# Patient Record
Sex: FEMALE | Race: WHITE | Employment: UNEMPLOYED | URBAN - METROPOLITAN AREA
[De-identification: names, ages, dates, MRNs, and addresses within clinical notes are randomized per-mention and may not be internally consistent; named-entity substitution may affect disease eponyms.]

---

## 2021-07-29 DIAGNOSIS — I10 BENIGN ESSENTIAL HTN: ICD-10-CM

## 2021-07-29 LAB
ANION GAP SERPL CALCULATED.3IONS-SCNC: 12 MMOL/L (ref 3–16)
BUN BLDV-MCNC: 16 MG/DL (ref 7–20)
CALCIUM SERPL-MCNC: 9.4 MG/DL (ref 8.3–10.6)
CHLORIDE BLD-SCNC: 102 MMOL/L (ref 99–110)
CO2: 28 MMOL/L (ref 21–32)
CREAT SERPL-MCNC: 0.7 MG/DL (ref 0.6–1.2)
GFR AFRICAN AMERICAN: >60
GFR NON-AFRICAN AMERICAN: >60
GLUCOSE BLD-MCNC: 81 MG/DL (ref 70–99)
POTASSIUM SERPL-SCNC: 4.6 MMOL/L (ref 3.5–5.1)
SODIUM BLD-SCNC: 142 MMOL/L (ref 136–145)

## 2022-06-08 PROBLEM — E66.811 CLASS 1 OBESITY DUE TO EXCESS CALORIES WITHOUT SERIOUS COMORBIDITY WITH BODY MASS INDEX (BMI) OF 31.0 TO 31.9 IN ADULT: Status: ACTIVE | Noted: 2022-05-26

## 2023-01-05 PROBLEM — E66.811 CLASS 1 OBESITY DUE TO EXCESS CALORIES WITHOUT SERIOUS COMORBIDITY WITH BODY MASS INDEX (BMI) OF 31.0 TO 31.9 IN ADULT: Chronic | Status: ACTIVE | Noted: 2022-05-26

## 2023-11-02 ENCOUNTER — OFFICE VISIT (OUTPATIENT)
Dept: VASCULAR SURGERY | Age: 63
End: 2023-11-02
Payer: COMMERCIAL

## 2023-11-02 VITALS
BODY MASS INDEX: 32.1 KG/M2 | DIASTOLIC BLOOD PRESSURE: 95 MMHG | WEIGHT: 187 LBS | HEART RATE: 74 BPM | SYSTOLIC BLOOD PRESSURE: 155 MMHG

## 2023-11-02 DIAGNOSIS — M79.89 SWELLING OF LIMB: Primary | ICD-10-CM

## 2023-11-02 PROCEDURE — G8417 CALC BMI ABV UP PARAM F/U: HCPCS | Performed by: SURGERY

## 2023-11-02 PROCEDURE — 3080F DIAST BP >= 90 MM HG: CPT | Performed by: SURGERY

## 2023-11-02 PROCEDURE — G8484 FLU IMMUNIZE NO ADMIN: HCPCS | Performed by: SURGERY

## 2023-11-02 PROCEDURE — 3017F COLORECTAL CA SCREEN DOC REV: CPT | Performed by: SURGERY

## 2023-11-02 PROCEDURE — G8427 DOCREV CUR MEDS BY ELIG CLIN: HCPCS | Performed by: SURGERY

## 2023-11-02 PROCEDURE — 3077F SYST BP >= 140 MM HG: CPT | Performed by: SURGERY

## 2023-11-02 PROCEDURE — 1036F TOBACCO NON-USER: CPT | Performed by: SURGERY

## 2023-11-02 PROCEDURE — 99203 OFFICE O/P NEW LOW 30 MIN: CPT | Performed by: SURGERY

## 2023-12-08 ENCOUNTER — OFFICE VISIT (OUTPATIENT)
Dept: ORTHOPEDIC SURGERY | Age: 63
End: 2023-12-08
Payer: COMMERCIAL

## 2023-12-08 VITALS — HEIGHT: 65 IN | BODY MASS INDEX: 29.97 KG/M2 | WEIGHT: 179.9 LBS

## 2023-12-08 DIAGNOSIS — M17.12 LOCALIZED OSTEOARTHRITIS OF LEFT KNEE: Primary | ICD-10-CM

## 2023-12-08 PROCEDURE — 1036F TOBACCO NON-USER: CPT | Performed by: ORTHOPAEDIC SURGERY

## 2023-12-08 PROCEDURE — 99213 OFFICE O/P EST LOW 20 MIN: CPT | Performed by: ORTHOPAEDIC SURGERY

## 2023-12-08 PROCEDURE — G8427 DOCREV CUR MEDS BY ELIG CLIN: HCPCS | Performed by: ORTHOPAEDIC SURGERY

## 2023-12-08 PROCEDURE — G8484 FLU IMMUNIZE NO ADMIN: HCPCS | Performed by: ORTHOPAEDIC SURGERY

## 2023-12-08 PROCEDURE — 3017F COLORECTAL CA SCREEN DOC REV: CPT | Performed by: ORTHOPAEDIC SURGERY

## 2023-12-08 PROCEDURE — G8417 CALC BMI ABV UP PARAM F/U: HCPCS | Performed by: ORTHOPAEDIC SURGERY

## 2023-12-13 ENCOUNTER — TELEPHONE (OUTPATIENT)
Dept: ORTHOPEDIC SURGERY | Age: 63
End: 2023-12-13

## 2023-12-13 NOTE — TELEPHONE ENCOUNTER
Other PATIENT IS REQUESTING A CALL BACK ONCE THE RECORDS FOR [facility] ARE RECEIVED.       PATIENT STATES LAST TIME THEY WERE SENT THE RECORDS WERE NOT READABLE    PATIENT CAN BE REACHED [phone]

## 2023-12-14 NOTE — TELEPHONE ENCOUNTER
RC/ Records Received     However - STILL NEED  \"obtain her last arthroscopic images from [facility]\"      LM on Machine    jm

## 2023-12-15 ENCOUNTER — OFFICE VISIT (OUTPATIENT)
Dept: ORTHOPEDIC SURGERY | Age: 63
End: 2023-12-15
Payer: COMMERCIAL

## 2023-12-15 VITALS — WEIGHT: 179 LBS | BODY MASS INDEX: 29.82 KG/M2 | HEIGHT: 65 IN

## 2023-12-15 DIAGNOSIS — M17.12 LOCALIZED OSTEOARTHRITIS OF LEFT KNEE: Primary | ICD-10-CM

## 2023-12-15 PROCEDURE — G8417 CALC BMI ABV UP PARAM F/U: HCPCS | Performed by: ORTHOPAEDIC SURGERY

## 2023-12-15 PROCEDURE — G8484 FLU IMMUNIZE NO ADMIN: HCPCS | Performed by: ORTHOPAEDIC SURGERY

## 2023-12-15 PROCEDURE — 99214 OFFICE O/P EST MOD 30 MIN: CPT | Performed by: ORTHOPAEDIC SURGERY

## 2023-12-15 PROCEDURE — G8427 DOCREV CUR MEDS BY ELIG CLIN: HCPCS | Performed by: ORTHOPAEDIC SURGERY

## 2023-12-15 PROCEDURE — 3017F COLORECTAL CA SCREEN DOC REV: CPT | Performed by: ORTHOPAEDIC SURGERY

## 2023-12-15 PROCEDURE — 1036F TOBACCO NON-USER: CPT | Performed by: ORTHOPAEDIC SURGERY

## 2023-12-15 NOTE — PROGRESS NOTES
There is a flap-type tear involving the inner and middle thirds of the  posterior body of the medial meniscus which is displaced inwardly and is  superimposed upon the root of the posterior horn of the medial meniscus. 2. There is abnormal linear signal identified within the middle third of the  anterior horn of the lateral meniscus which is suspicious for a meniscal tear. The body and posterior horn of the lateral meniscus are diminutive in size. Correlate for prior partial meniscectomy. 4. Small chondral defect is identified at the medial weightbearing surface of  the medial femoral condyle. 5. Patellofemoral joint osteoarthrosis as detailed above. 6. Moderate size joint effusion. Personal review of the MRI does demonstrate at least moderate thinning of the medial joint surface. Moderate thinning of the patellofemoral joint surface with well-maintained cartilage and laterally. She does have a medial and lateral meniscus tear    I reviewed scope images. Cruciate ligaments are intact. Degenerative meniscus tear greatly present over the medial side, minimally over the posterior horn lateral side. There is evidence of focal cartilage loss, but difficult to discern from the pictures whether it is the lateral part of the medial femoral condyle or the opposite. Grade III chondromalacia present in the patella from what it seems. Procedure:  No orders of the defined types were placed in this encounter. Assessment and [place] was seen today for knee pain. Diagnoses and all orders for this visit:    Localized osteoarthritis of left knee        Patient's MRI is definitely worse than her x-rays are for osteoarthritis. She has had a very extensive course of treatment and failed attempts at 2 arthroscopies. I do not believe she has enough arthritis to make total knee replacement highly predictable.   At most, I would consider a partial medial unicondylar knee replacement, possibly consider by

## 2024-01-11 ENCOUNTER — OFFICE VISIT (OUTPATIENT)
Dept: ORTHOPEDIC SURGERY | Age: 64
End: 2024-01-11

## 2024-01-11 VITALS — HEIGHT: 65 IN | BODY MASS INDEX: 29.82 KG/M2 | WEIGHT: 179 LBS

## 2024-01-11 DIAGNOSIS — G57.02 COMPRESSION OF COMMON PERONEAL NERVE OF LEFT LOWER EXTREMITY: ICD-10-CM

## 2024-01-11 DIAGNOSIS — M25.562 LEFT KNEE PAIN, UNSPECIFIED CHRONICITY: ICD-10-CM

## 2024-01-11 DIAGNOSIS — M17.12 LOCALIZED OSTEOARTHRITIS OF LEFT KNEE: Primary | ICD-10-CM

## 2024-01-11 RX ORDER — METHYLPREDNISOLONE ACETATE 40 MG/ML
40 INJECTION, SUSPENSION INTRA-ARTICULAR; INTRALESIONAL; INTRAMUSCULAR; SOFT TISSUE ONCE
Status: COMPLETED | OUTPATIENT
Start: 2024-01-11 | End: 2024-01-11

## 2024-01-11 RX ADMIN — METHYLPREDNISOLONE ACETATE 40 MG: 40 INJECTION, SUSPENSION INTRA-ARTICULAR; INTRALESIONAL; INTRAMUSCULAR; SOFT TISSUE at 12:18

## 2024-01-11 NOTE — PROGRESS NOTES
Bilateral plantar fasciitis 2016    COVID 2023    Obstructive sleep apnea syndrome 2023        Past Surgical History:   Procedure Laterality Date    BREAST BIOPSY  2021    fibroadenoma    BREAST REDUCTION SURGERY  2012    CHOLECYSTECTOMY  1995    COLONOSCOPY  2017    hyperplastic polyp    ERCP      ERCP      KNEE CARTILAGE SURGERY Left 2012    UPPER GASTROINTESTINAL ENDOSCOPY      retained stones--ERCP    US BREAST BIOPSY W LOC DEVICE 1ST LESION RIGHT Right 2021    US BREAST NEEDLE BIOPSY RIGHT 2021 Shelly Cueto MD [facility] ULTRASOUND       Family History   Problem Relation Age of Onset    High Blood Pressure Mother     High Blood Pressure Father     Heart Disease Father     Heart Disease Brother 66        STENTS    High Blood Pressure Brother     Sleep Apnea Brother     Cancer Maternal Grandfather 80        colon       Social History     Socioeconomic History    Marital status:     Number of children: 5   Occupational History    Occupation:    Tobacco Use    Smoking status: Former     Current packs/day: 0.00     Average packs/day: 0.1 packs/day for 10.0 years (1.0 ttl pk-yrs)     Types: Cigarettes     Start date:      Quit date:      Years since quittin.0    Smokeless tobacco: Never   Vaping Use    Vaping Use: Never used   Substance and Sexual Activity    Alcohol use: Yes     Alcohol/week: 1.0 standard drink of alcohol     Types: 1 Glasses of wine per week    Drug use: No    Sexual activity: Yes     Partners: Male   Social History Narrative    Happily -older kids out of home (from prev marriage) and  2 kids in college    Little exercise    Hobbies-none    +seatbelts    retired     Social Determinants of Health     Financial Resource Strain: Low Risk  (2023)    Overall Financial Resource Strain (CARDIA)     Difficulty of Paying Living Expenses: Not hard at all   Transportation Needs: Unknown (2023)    PRAPARE -

## 2024-01-15 ENCOUNTER — HOSPITAL ENCOUNTER (OUTPATIENT)
Dept: PHYSICAL THERAPY | Age: 64
Setting detail: THERAPIES SERIES
Discharge: HOME | End: 2024-01-15
Payer: COMMERCIAL

## 2024-01-15 DIAGNOSIS — R29.898 WEAKNESS OF LEFT LOWER EXTREMITY: ICD-10-CM

## 2024-01-15 DIAGNOSIS — M25.562 LEFT KNEE PAIN, UNSPECIFIED CHRONICITY: Primary | ICD-10-CM

## 2024-01-15 DIAGNOSIS — M25.469 EDEMA OF KNEE: ICD-10-CM

## 2024-01-15 PROCEDURE — 97161 PT EVAL LOW COMPLEX 20 MIN: CPT | Performed by: PHYSICAL THERAPIST

## 2024-01-15 PROCEDURE — 97016 VASOPNEUMATIC DEVICE THERAPY: CPT | Performed by: PHYSICAL THERAPIST

## 2024-01-15 PROCEDURE — 97110 THERAPEUTIC EXERCISES: CPT | Performed by: PHYSICAL THERAPIST

## 2024-01-15 NOTE — FLOWSHEET NOTE
in: 2 weeks  Independent in HEP and progression per patient tolerance, in order to progress toward full function and prevent re-injury.               Status: [] Progressing: [] Met: [] Not Met: [] Adjusted  Patient will have a decrease in pain to 0/10 to help  facilitate improvement in movement, function, and ADLs as indicated by functional deficits.              Status: [] Progressing: [] Met: [] Not Met: [] Adjusted     Long Term Goals: To be achieved in: 6 weeks  Disability index score of 20% or less for the LEFS to assist with return top prior level of function.                  Status: [] Progressing: [] Met: [] Not Met: [] Adjusted  Improve  knee AROM  Flexion and extension to 0-120 degrees or  better to allow for proper joint functioning as indicated by patients functional deficits.  Status: [] Progressing: [] Met: [] Not Met: [] Adjusted  Pt to improve strength to 4+/5 or better of proximal hip, quadriceps, and hamstringsto allow for proper muscle and joint use in functional mobility, ADLs and prior level of function  Status: [] Progressing: [] Met: [] Not Met: [] Adjusted  Patient will return to Usual work, housework or activities, light home activities, walk 2 blocks, and up/down 1 flight of stairs without increased symptoms or restriction to work towards return to prior level of function.  Status: [] Progressing: [] Met: [] Not Met: [] Adjusted  Patient will perform normal ADLs without symptoms 100% of the time                                                                                                                Status: [] Progressing: [] Met: [] Not Met: [] Adjusted    Overall Progression Towards Functional goals/ Treatment Progress Update:  [] Patient is progressing as expected towards functional goals listed.    [] Progression is slowed due to complexities/Impairments listed.  [] Progression has been slowed due to co-morbidities.  [x] Plan just implemented, too soon (<30days) to assess goals

## 2024-01-15 NOTE — PLAN OF CARE
Limitations (from functional questionnaire and intake):  Reduced ability to tolerate prolonged functional positions  Reduced ability to sleep  Reduced ability to perform lifting, reaching, carrying tasks  Reduced ability to squat  Reduced ability to ambulate prolonged functional periods/distances/surfaces  Reduced ability to ascend/descend stairs  reduced ability to kneel    Participation Restrictions:   Reduced participation in home management   Reduced participation in social activities    Classification :   Signs/symptoms consistent with internal derangement of knee/Hip    Barriers to/and or personal factors that will affect rehab potential:   None noted    Prognosis/Rehab Potential:  [] Excellent     [x] Good     [] Fair     [] Poor         Tolerance of evaluation/treatment:   [] Excellent     [x] Good     [] Fair     [] Poor      Physical Therapy Evaluation Complexity Justification  [x] A history of present problem and no personal factors and/or co-morbidities that impact the plan of care  [x] A total of 1-2 elements  found upon examination of body systems using standardized tests and measures addressing any of the following: body structures, functions (impairments), activity limitations, and/or participation restrictions  [x] A clinical presentation with stable and/or uncomplicated characteristics   [x] Clinical decision making of LOW (28251 - Typically 20 minutes face-to-face) complexity using standardized patient assessment instrument and/or measurable assessment of functional outcome.    GOALS     Patient stated goal: To return to walking and ADLs without pain or dysfunction 100% of the time.   Status: [] Progressing: [] Met: [] Not Met: [] Adjusted    Therapist goals for Patient:   Short Term Goals: To be achieved in: 2 weeks  Independent in HEP and progression per patient tolerance, in order to progress toward full function and prevent re-injury.    Status: [] Progressing: [] Met: [] Not Met: []

## 2024-01-29 ENCOUNTER — HOSPITAL ENCOUNTER (OUTPATIENT)
Dept: PHYSICAL THERAPY | Age: 64
Setting detail: THERAPIES SERIES
Discharge: HOME | End: 2024-01-29
Payer: COMMERCIAL

## 2024-01-29 PROCEDURE — 97110 THERAPEUTIC EXERCISES: CPT | Performed by: PHYSICAL THERAPIST

## 2024-01-29 NOTE — FLOWSHEET NOTE
[facility]- Outpatient Rehabilitation and Therapy [address], [place] [address] office: [phone] fax: [phone]      Physical Therapy: TREATMENT/PROGRESS NOTE   Patient: Amina Sanon (63 y.o. female)   Treatment Date: 2024   :  1960 MRN: [id number]   Visit #: 2   Insurance Allowable Auth Needed   TBD []Yes    []No    Insurance: Payor: UNITED HE[employer]CARE / Plan: [employer] - CHOICE PLU / Product Type: *No Product type* /   Insurance ID: [id number] - (Commercial)  Secondary Insurance (if applicable):    Treatment Diagnosis:   No diagnosis found.     Medical Diagnosis:    Localized osteoarthritis of left knee [M17.12]  Left knee pain, unspecified chronicity [M25.562]   Referring Physician: Georges Babcock MD  PCP: Manolo Bonilla MD                             Plan of care signed (Y/N):     Date of Patient follow up with Physician:      Progress Report/POC: NO  POC update due: (10 visits /OR AUTH LIMITS, whichever is less)  2024       Preferred Language for Healthcare:   [x]English       []other:    SUBJECTIVE EXAMINATION     Patient Report/Comments: Patient reports significant improvement in symptoms following her corticosteroid injection on . Denies any complications with HEP.  EMG is scheduled for Friday.       Test used Initial score  1/15/24    Pain Summary VAS 3/10    Functional questionnaire LEFS 47% deficit    Other:                OBJECTIVE EXAMINATION     Observation:     Test measurements: see eval    Exercises/Interventions:     Therapeutic Ex (03709)  resistance Sets/time Reps Notes/Cues/Progressions   Calf stretch slant board  30\" 5           SLR 1# 3 10    SL hip abd  3 10    SLR+  15\" 5    LAQ 2# 3 10    Clamshells  Blue loop 3 10    Glute bridges Blue loop 3 10    Wall sits   30\" 5 ROM: 60 deg          Manual Intervention (35737)  TIME                                        NMR re-education (06435) resistance Sets/time

## 2024-02-01 ENCOUNTER — HOSPITAL ENCOUNTER (OUTPATIENT)
Dept: PHYSICAL THERAPY | Age: 64
Setting detail: THERAPIES SERIES
Discharge: HOME | End: 2024-02-01
Payer: COMMERCIAL

## 2024-02-01 PROCEDURE — 97110 THERAPEUTIC EXERCISES: CPT | Performed by: PHYSICAL THERAPIST

## 2024-02-01 NOTE — FLOWSHEET NOTE
musculoskeletal condition(s) with a corresponding ICD-10 code that is of complexity and severity that require skilled therapeutic intervention. This has a direct and significant impact on the need for therapy and significantly impacts the rate of recovery.     Treatment/Activity Tolerance:  [x] Patient tolerated treatment well [] Patient limited by fatique  [] Patient limited by pain  [] Patient limited by other medical complications  [] Other:     Return to Play: NA    Prognosis for POC: [x] Good [] Fair  [] Poor    Patient requires continued skilled intervention: [x] Yes  [] No      GOALS     Patient stated goal: To return to ADLs without pain or dysfunction 100% of the time.   Status: [] Progressing: [] Met: [] Not Met: [] Adjusted     Therapist goals for Patient:   Short Term Goals: To be achieved in: 2 weeks  Independent in HEP and progression per patient tolerance, in order to progress toward full function and prevent re-injury.               Status: [] Progressing: [] Met: [] Not Met: [] Adjusted  Patient will have a decrease in pain to 0/10 to help  facilitate improvement in movement, function, and ADLs as indicated by functional deficits.              Status: [] Progressing: [] Met: [] Not Met: [] Adjusted     Long Term Goals: To be achieved in: 6 weeks  Disability index score of 20% or less for the LEFS to assist with return top prior level of function.                  Status: [] Progressing: [] Met: [] Not Met: [] Adjusted  Improve  knee AROM  Flexion and extension to 0-120 degrees or  better to allow for proper joint functioning as indicated by patients functional deficits.  Status: [] Progressing: [] Met: [] Not Met: [] Adjusted  Pt to improve strength to 4+/5 or better of proximal hip, quadriceps, and hamstringsto allow for proper muscle and joint use in functional mobility, ADLs and prior level of function  Status: [] Progressing: [] Met: [] Not Met: [] Adjusted  Patient will return to Usual work,

## 2024-02-05 ENCOUNTER — HOSPITAL ENCOUNTER (OUTPATIENT)
Dept: PHYSICAL THERAPY | Age: 64
Setting detail: THERAPIES SERIES
Discharge: HOME | End: 2024-02-05
Payer: COMMERCIAL

## 2024-02-05 PROCEDURE — 97110 THERAPEUTIC EXERCISES: CPT | Performed by: PHYSICAL THERAPIST

## 2024-02-05 NOTE — FLOWSHEET NOTE
[facility]- Outpatient Rehabilitation and Therapy [address], [place] [address] office: [phone] fax: [phone]      Physical Therapy: TREATMENT/PROGRESS NOTE   Patient: Amina Sanon (63 y.o. female)   Treatment Date: 2024   :  1960 MRN: [id number]   Visit #: 4   Insurance Allowable Auth Needed   TBD []Yes    []No    Insurance: Payor: UNITED HEALTHCARE / Plan: [employer] - CHOICE PLU / Product Type: *No Product type* /   Insurance ID: [id number] - (Commercial)  Secondary Insurance (if applicable):    Treatment Diagnosis:   No diagnosis found.     Medical Diagnosis:    Localized osteoarthritis of left knee [M17.12]  Left knee pain, unspecified chronicity [M25.562]   Referring Physician: Georges Babcock MD  PCP: Manolo Bonilla MD                             Plan of care signed (Y/N):     Date of Patient follow up with Physician:      Progress Report/POC: NO  POC update due: (10 visits /OR AUTH LIMITS, whichever is less)  2024       Preferred Language for Healthcare:   [x]English       []other:    SUBJECTIVE EXAMINATION     Patient Report/Comments: Patient reports constant, vague discomfort in her PFJ since last visit but overall improved compared to initial evaluation. She has noticed an increase in strength as she is managing stairs better.  She is able to walk 1 mile without an increase in symptoms.  EMG performed last Friday and does not have results yet.         Test used Initial score  1/15/24    Pain Summary VAS 3/10    Functional questionnaire LEFS 47% deficit    Other:                OBJECTIVE EXAMINATION     Observation:     Test measurements: see eval    Exercises/Interventions:     Therapeutic Ex (37351)  resistance Sets/time Reps Notes/Cues/Progressions   Calf stretch slant board  30\" 5    Seated hamstring stretch  30\" 5    SLR 2# 3 10    SL hip abd 1# 3 10 Progress NPV   SLR+  20\" 5    LAQ 2# 3 10 Held 2/5   Clamshells  Blue loop 3

## 2024-02-08 ENCOUNTER — HOSPITAL ENCOUNTER (OUTPATIENT)
Dept: PHYSICAL THERAPY | Age: 64
Setting detail: THERAPIES SERIES
Discharge: HOME | End: 2024-02-08
Payer: COMMERCIAL

## 2024-02-08 PROCEDURE — 97110 THERAPEUTIC EXERCISES: CPT | Performed by: PHYSICAL THERAPIST

## 2024-02-08 NOTE — FLOWSHEET NOTE
strength or increase flexibility, following either an injury or surgery.   (13570) HOME EXERCISE PROGRAM - Reviewed/Progressed HEP activities related to strengthening, flexibility, endurance, ROM performed to prevent loss of range of motion, maintain or improve muscular strength or increase flexibility, following either an injury or surgery.  (29450) VASOPNEUMATIC    TREATMENT PLAN   Plan: Cont POC- Continue emphasis/focus on exercise progression, modulating pain, and reduce/eliminate soft tissue swelling/inflammation/restriction. Next visit plan to add new exercises     Electronically Signed by RICARDO Vuong              Date: 02/08/2024     Note: If patient does not return for scheduled/recommended follow up visits, this note will serve as a discharge from care along with the most recent update on progress.

## 2024-02-09 RX ORDER — TIRZEPATIDE 2.5 MG/.5ML
2.5 INJECTION, SOLUTION SUBCUTANEOUS WEEKLY
Qty: 2 ML | Refills: 2 | Status: SHIPPED | OUTPATIENT
Start: 2024-02-09

## 2024-02-12 ENCOUNTER — HOSPITAL ENCOUNTER (OUTPATIENT)
Dept: PHYSICAL THERAPY | Age: 64
Setting detail: THERAPIES SERIES
Discharge: HOME | End: 2024-02-12
Payer: COMMERCIAL

## 2024-02-12 PROCEDURE — 97110 THERAPEUTIC EXERCISES: CPT | Performed by: PHYSICAL THERAPIST

## 2024-02-12 NOTE — FLOWSHEET NOTE
following either an injury or surgery.   (88834) HOME EXERCISE PROGRAM - Reviewed/Progressed HEP activities related to strengthening, flexibility, endurance, ROM performed to prevent loss of range of motion, maintain or improve muscular strength or increase flexibility, following either an injury or surgery.  (02568) VASOPNEUMATIC    TREATMENT PLAN   Plan: Cont POC- Continue emphasis/focus on exercise progression, modulating pain, and reduce/eliminate soft tissue swelling/inflammation/restriction. Next visit plan to add new exercises     Electronically Signed by RICARDO Vuong              Date: 02/12/2024     Note: If patient does not return for scheduled/recommended follow up visits, this note will serve as a discharge from care along with the most recent update on progress.

## 2024-02-13 ENCOUNTER — OFFICE VISIT (OUTPATIENT)
Dept: ORTHOPEDIC SURGERY | Age: 64
End: 2024-02-13
Payer: COMMERCIAL

## 2024-02-13 VITALS — WEIGHT: 179 LBS | HEIGHT: 65 IN | BODY MASS INDEX: 29.82 KG/M2

## 2024-02-13 DIAGNOSIS — M17.12 LOCALIZED OSTEOARTHRITIS OF LEFT KNEE: Primary | ICD-10-CM

## 2024-02-13 DIAGNOSIS — G57.02 COMPRESSION OF COMMON PERONEAL NERVE OF LEFT LOWER EXTREMITY: ICD-10-CM

## 2024-02-13 PROCEDURE — G8484 FLU IMMUNIZE NO ADMIN: HCPCS | Performed by: ORTHOPAEDIC SURGERY

## 2024-02-13 PROCEDURE — 3017F COLORECTAL CA SCREEN DOC REV: CPT | Performed by: ORTHOPAEDIC SURGERY

## 2024-02-13 PROCEDURE — G8417 CALC BMI ABV UP PARAM F/U: HCPCS | Performed by: ORTHOPAEDIC SURGERY

## 2024-02-13 PROCEDURE — 99214 OFFICE O/P EST MOD 30 MIN: CPT | Performed by: ORTHOPAEDIC SURGERY

## 2024-02-13 PROCEDURE — G8428 CUR MEDS NOT DOCUMENT: HCPCS | Performed by: ORTHOPAEDIC SURGERY

## 2024-02-13 PROCEDURE — 1036F TOBACCO NON-USER: CPT | Performed by: ORTHOPAEDIC SURGERY

## 2024-02-13 NOTE — PROGRESS NOTES
Date:  2024    Name:  Amina Sanon  Address:  [address] [place] [address]   [facility] [address]    :  1960      Age:   63 y.o.        Chief Complaint    Knee Pain (Left knee EMG TR 24)    History of Present Illness:  Amina Sanon is a 63 y.o. female who presents for repeat evaluation today of her left knee.  She was last seen in early January due to progressive left knee pain.  At that visit, MRI was reviewed and did show evidence of degenerative meniscal pathology as well as underlying osteoarthritis.  At that time, she underwent a corticosteroid injection for her left knee as well as was sent for referral for an EMG/nerve conduction study due to concern for peroneal nerve entrapment at the fibular neck.    Patient reports now that her knee symptoms have substantially improved status post injection.  She reports that now she is able to do many more things including walking comfortably, swelling improvement, and improved range of motion since this injection.  She says that this is one of the first injections that have provided relief but she feels she is substantially improved after this time.  She was brought today to review her EMG/nerve conduction study results performed at [facility] by Dr. Frank.  No new injuries.  No other complaints this time.          Past Medical History:   Diagnosis Date    Allergic rhinitis 2022    Benign essential HTN 2006    Bilateral plantar fasciitis 2016    COVID 2023    Obstructive sleep apnea syndrome 2023        Past Surgical History:   Procedure Laterality Date    BREAST BIOPSY  2021    fibroadenoma    BREAST REDUCTION SURGERY      CHOLECYSTECTOMY  1995    COLONOSCOPY  2017    hyperplastic polyp    ERCP      ERCP      KNEE CARTILAGE SURGERY Left     UPPER GASTROINTESTINAL ENDOSCOPY      retained stones--ERCP    US BREAST BIOPSY W LOC DEVICE 1ST LESION RIGHT Right 2021    US BREAST NEEDLE BIOPSY

## 2024-02-15 ENCOUNTER — HOSPITAL ENCOUNTER (OUTPATIENT)
Dept: PHYSICAL THERAPY | Age: 64
Setting detail: THERAPIES SERIES
Discharge: HOME | End: 2024-02-15
Payer: COMMERCIAL

## 2024-02-15 ENCOUNTER — HOSPITAL ENCOUNTER (OUTPATIENT)
Dept: MAMMOGRAPHY | Age: 64
Discharge: HOME | End: 2024-02-15
Payer: COMMERCIAL

## 2024-02-15 VITALS — BODY MASS INDEX: 29.82 KG/M2 | WEIGHT: 179 LBS | HEIGHT: 65 IN

## 2024-02-15 DIAGNOSIS — Z12.31 VISIT FOR SCREENING MAMMOGRAM: ICD-10-CM

## 2024-02-15 PROCEDURE — 97530 THERAPEUTIC ACTIVITIES: CPT | Performed by: PHYSICAL THERAPIST

## 2024-02-15 PROCEDURE — 97110 THERAPEUTIC EXERCISES: CPT | Performed by: PHYSICAL THERAPIST

## 2024-02-15 PROCEDURE — 77063 BREAST TOMOSYNTHESIS BI: CPT

## 2024-02-15 NOTE — FLOWSHEET NOTE
Manual (03634)    Estim Unattended (74003)     Ther. Act (10368)  1  Mech. Traction (47182)     Gait (32727)    Dry Needle 1-2 muscle (20560)     Aquatic Therex (23280)    Dry Needle 3+ muscle (20561)     Iontophoresis (76598)    VASO (11840)     Ultrasound (47788)    Group Therapy (41098)     Estim Attended (62234)    Canalith Repositioning (92434)     Other:    Other:    Total Timed Code Tx Minutes 32'        Total Treatment Minutes 1:40-3:00 80'        Charge Justification:  (92691) THERAPEUTIC EXERCISE - Provided verbal/tactile cueing for activities related to strengthening, flexibility, endurance, ROM performed to prevent loss of range of motion, maintain or improve muscular strength or increase flexibility, following either an injury or surgery.   (42557) HOME EXERCISE PROGRAM - Reviewed/Progressed HEP activities related to strengthening, flexibility, endurance, ROM performed to prevent loss of range of motion, maintain or improve muscular strength or increase flexibility, following either an injury or surgery.  (76476) THERAPEUTIC ACTIVITY - use of dynamic activities to improve functional performance. (Ex include squatting, ascending/descending stairs, walking, bending, lifting, catching, throwing, pushing, pulling, jumping.)  Direct, one on one contact, billed in 15-minute increments.  (68400) VASOPNEUMATIC    TREATMENT PLAN   Plan: Cont POC- Continue emphasis/focus on exercise progression, modulating pain, and reduce/eliminate soft tissue swelling/inflammation/restriction. Next visit plan to add new exercises     Electronically Signed by RICARDO Vuong              Date: 02/15/2024     Note: If patient does not return for scheduled/recommended follow up visits, this note will serve as a discharge from care along with the most recent update on progress.

## 2024-02-20 ENCOUNTER — HOSPITAL ENCOUNTER (OUTPATIENT)
Dept: PHYSICAL THERAPY | Age: 64
Setting detail: THERAPIES SERIES
Discharge: HOME | End: 2024-02-20
Payer: COMMERCIAL

## 2024-02-20 PROCEDURE — 97110 THERAPEUTIC EXERCISES: CPT | Performed by: PHYSICAL THERAPIST

## 2024-02-20 PROCEDURE — 97530 THERAPEUTIC ACTIVITIES: CPT | Performed by: PHYSICAL THERAPIST

## 2024-02-20 NOTE — FLOWSHEET NOTE
[facility]- Outpatient Rehabilitation and Therapy [address], [place] [address] office: [phone] fax: [phone]      Physical Therapy: TREATMENT/PROGRESS NOTE   Patient: Amina Sanon (63 y.o. female)   Treatment Date: 2024   :  1960 MRN: [id number]   Visit #: 8   Insurance Allowable Auth Needed   TBD []Yes    []No    Insurance: Payor: UNITED HEALTHCARE / Plan: [employer] - CHOICE PLU / Product Type: *No Product type* /   Insurance ID: [id number] - (Commercial)  Secondary Insurance (if applicable):    Treatment Diagnosis:   No diagnosis found.     Medical Diagnosis:    Localized osteoarthritis of left knee [M17.12]  Left knee pain, unspecified chronicity [M25.562]   Referring Physician: Georges Babcock MD  PCP: Manolo Bonilla MD                             Plan of care signed (Y/N):     Date of Patient follow up with Physician:      Progress Report/POC: YES, Date Range for this report: 2/15/24 - 3/15/24  POC update due: (10 visits /OR AUTH LIMITS, whichever is less)  3/15/2024       Preferred Language for Healthcare:   [x]English       []other:    SUBJECTIVE EXAMINATION     Patient Report/Comments: Patient reports overall improvement in her knee pain.  Reports that she did note a slight increase in symptoms along the peroneal muscles after last visit.  May be related to the change in activities in therapy.         Test used Initial score  1/15/24 2/15/24   Pain Summary VAS 3/10 0/10   Functional questionnaire LEFS 47% deficit 29% deficit    Other:                OBJECTIVE EXAMINATION     Observation:     Test measurements:   ROM/Strength: (Blank cells denote NT)   2/15/24    Mvmt (norm) AROM L AROM R Notes PROM L PROM R Notes                        LUMBAR Flex (90)              Ext (25)              SB (25)                Rotation (30)                                       HIP Flex (120)                Abd (45)                ER (50)

## 2024-02-22 ENCOUNTER — APPOINTMENT (OUTPATIENT)
Dept: PHYSICAL THERAPY | Age: 64
End: 2024-02-22
Payer: COMMERCIAL

## 2024-02-26 ENCOUNTER — HOSPITAL ENCOUNTER (OUTPATIENT)
Dept: PHYSICAL THERAPY | Age: 64
Setting detail: THERAPIES SERIES
Discharge: HOME | End: 2024-02-26
Payer: COMMERCIAL

## 2024-02-26 PROCEDURE — 97530 THERAPEUTIC ACTIVITIES: CPT | Performed by: PHYSICAL THERAPIST

## 2024-02-26 PROCEDURE — 97110 THERAPEUTIC EXERCISES: CPT | Performed by: PHYSICAL THERAPIST

## 2024-02-26 NOTE — FLOWSHEET NOTE
[facility]- Outpatient Rehabilitation and Therapy [address], [place] [address] office: [phone] fax: [phone]      Physical Therapy: TREATMENT/PROGRESS NOTE   Patient: Amina Sanon (63 y.o. female)   Treatment Date: 2024   :  1960 MRN: [id number]   Visit #: 9   Insurance Allowable Auth Needed   TBD []Yes    []No    Insurance: Payor: UNITED HEALTHCARE / Plan: [employer] - CHOICE PLU / Product Type: *No Product type* /   Insurance ID: [id number] - (Commercial)  Secondary Insurance (if applicable):    Treatment Diagnosis:   No diagnosis found.     Medical Diagnosis:    Localized osteoarthritis of left knee [M17.12]  Left knee pain, unspecified chronicity [M25.562]   Referring Physician: Georges Babcock MD  PCP: Manolo Bonilla MD                             Plan of care signed (Y/N):     Date of Patient follow up with Physician:      Progress Report/POC: YES, Date Range for this report: 2/15/24 - 3/15/24  POC update due: (10 visits /OR AUTH LIMITS, whichever is less)  3/15/2024       Preferred Language for Healthcare:   [x]English       []other:    SUBJECTIVE EXAMINATION     Patient Report/Comments: Patient reports that her knee has been doing really well the past couple of days.  She reports that can't recall any particular reason why she is feeling better.  Nerve symptoms seem to be better as well.         Test used Initial score  1/15/24 2/15/24   Pain Summary VAS 3/10 0/10   Functional questionnaire LEFS 47% deficit 29% deficit    Other:                OBJECTIVE EXAMINATION     Observation:     Test measurements:   ROM/Strength: (Blank cells denote NT)   2/15/24    Mvmt (norm) AROM L AROM R Notes PROM L PROM R Notes                        LUMBAR Flex (90)              Ext (25)              SB (25)                Rotation (30)                                       HIP Flex (120)                Abd (45)                ER (50)                IR

## 2024-02-29 ENCOUNTER — HOSPITAL ENCOUNTER (OUTPATIENT)
Dept: PHYSICAL THERAPY | Age: 64
Setting detail: THERAPIES SERIES
Discharge: HOME | End: 2024-02-29
Payer: COMMERCIAL

## 2024-02-29 PROCEDURE — 97110 THERAPEUTIC EXERCISES: CPT | Performed by: PHYSICAL THERAPIST

## 2024-02-29 PROCEDURE — 97530 THERAPEUTIC ACTIVITIES: CPT | Performed by: PHYSICAL THERAPIST

## 2024-02-29 NOTE — FLOWSHEET NOTE
[facility]- Outpatient Rehabilitation and Therapy [address], [place] [address] office: [phone] fax: [phone]      Physical Therapy: TREATMENT/PROGRESS NOTE   Patient: Amina Sanon (63 y.o. female)   Treatment Date: 2024   :  1960 MRN: [id number]   Visit #: 10   Insurance Allowable Auth Needed   TBD []Yes    []No    Insurance: Payor: [employer] / Plan: [employer] - CHOICE PLU / Product Type: *No Product type* /   Insurance ID: [id number] - (Commercial)  Secondary Insurance (if applicable):    Treatment Diagnosis:   No diagnosis found.     Medical Diagnosis:    Localized osteoarthritis of left knee [M17.12]  Left knee pain, unspecified chronicity [M25.562]   Referring Physician: Georges Babcock MD  PCP: Manolo Bonilla MD                             Plan of care signed (Y/N):     Date of Patient follow up with Physician:      Progress Report/POC: YES, Date Range for this report: 2/15/24 - 3/15/24  POC update due: (10 visits /OR AUTH LIMITS, whichever is less)  3/15/2024       Preferred Language for Healthcare:   [x]English       []other:    SUBJECTIVE EXAMINATION     Patient Report/Comments: Patient reports that her knee has been doing really well the past couple of days.  Reports no pain at all so far today.  She does note some discomfort yesterday.  She believes this may have been due to the change in weather.  She does feel like the nerve gliding is helping her symptoms.         Test used Initial score  1/15/24 2/15/24   Pain Summary VAS 3/10 0/10   Functional questionnaire LEFS 47% deficit 29% deficit    Other:                OBJECTIVE EXAMINATION     Observation:     Test measurements:   ROM/Strength: (Blank cells denote NT)   2/15/24    Mvmt (norm) AROM L AROM R Notes PROM L PROM R Notes                        LUMBAR Flex (90)              Ext (25)              SB (25)                Rotation (30)

## 2024-03-05 ENCOUNTER — HOSPITAL ENCOUNTER (OUTPATIENT)
Dept: PHYSICAL THERAPY | Age: 64
Setting detail: THERAPIES SERIES
Discharge: HOME | End: 2024-03-05
Payer: COMMERCIAL

## 2024-03-05 PROCEDURE — 97110 THERAPEUTIC EXERCISES: CPT | Performed by: PHYSICAL THERAPY ASSISTANT

## 2024-03-05 PROCEDURE — 97110 THERAPEUTIC EXERCISES: CPT | Performed by: PHYSICAL THERAPIST

## 2024-03-05 PROCEDURE — 97530 THERAPEUTIC ACTIVITIES: CPT | Performed by: PHYSICAL THERAPIST

## 2024-03-05 PROCEDURE — 97530 THERAPEUTIC ACTIVITIES: CPT | Performed by: PHYSICAL THERAPY ASSISTANT

## 2024-03-05 NOTE — FLOWSHEET NOTE
flexibility, endurance, ROM performed to prevent loss of range of motion, maintain or improve muscular strength or increase flexibility, following either an injury or surgery.   (18608) HOME EXERCISE PROGRAM - Reviewed/Progressed HEP activities related to strengthening, flexibility, endurance, ROM performed to prevent loss of range of motion, maintain or improve muscular strength or increase flexibility, following either an injury or surgery.  (54713) THERAPEUTIC ACTIVITY - use of dynamic activities to improve functional performance. (Ex include squatting, ascending/descending stairs, walking, bending, lifting, catching, throwing, pushing, pulling, jumping.)  Direct, one on one contact, billed in 15-minute increments.  (04090) VASOPNEUMATIC    TREATMENT PLAN   Plan: Cont POC- Continue emphasis/focus on exercise progression, modulating pain, and reduce/eliminate soft tissue swelling/inflammation/restriction. Next visit plan to add new exercises   Re-assess NPV, anticipate d/c to I HEP.    Electronically Signed by Sharron Marina, PTA              Date: 03/05/2024     Note: If patient does not return for scheduled/recommended follow up visits, this note will serve as a discharge from care along with the most recent update on progress.

## 2024-03-07 ENCOUNTER — HOSPITAL ENCOUNTER (OUTPATIENT)
Dept: PHYSICAL THERAPY | Age: 64
Setting detail: THERAPIES SERIES
Discharge: HOME | End: 2024-03-07
Payer: COMMERCIAL

## 2024-03-07 PROCEDURE — 97530 THERAPEUTIC ACTIVITIES: CPT | Performed by: PHYSICAL THERAPIST

## 2024-03-07 PROCEDURE — 97110 THERAPEUTIC EXERCISES: CPT | Performed by: PHYSICAL THERAPIST

## 2024-03-07 NOTE — DISCHARGE SUMMARY
[facility]- Outpatient Rehabilitation and Therapy [address], [address], [place] [address] office: [phone] fax: [phone]    Physical Therapy Re-Certification Plan of Care    Dear Georges Babcock MD  ,    We had the pleasure of treating the following patient for physical therapy services at [facility]. A summary of our findings can be found in the updated assessment below.  This includes our plan of care.  If you have any questions or concerns regarding these findings, please do not hesitate to contact me at the office phone number checked above.  Thank you for the referral.     Physician Signature:________________________________Date:__________________  By signing above (or electronic signature), therapist's plan is approved by physician      Overall Response to Treatment:   [x]Patient is responding well to treatment and improvement is noted with regards to goals   [x]Patient should continue to improve in reasonable time if they continue HEP   []Patient has plateaued and is no longer responding to skilled PT intervention    []Patient is getting worse and would benefit from return to referring MD   []Patient unable to adhere to initial POC   []Other:     Total Visits: 12     Recommendation:    [] Continue PT x / wk for  weeks.   [] Hold PT, pending MD visit   [x] Discharge to HEP. Follow up with PT or MD PRN.     Physical Therapy: TREATMENT/PROGRESS NOTE   Patient: Amina Sanon (63 y.o. female)   Treatment Date: 2024   :  1960 MRN: [id number]   Visit #: 12   Insurance Allowable Auth Needed   TBD []Yes    []No    Insurance: Payor: UNITED HE[employer]CARE / Plan: [employer] - CHOICE PLU / Product Type: *No Product type* /   Insurance ID: [id number] - (Commercial)  Secondary Insurance (if applicable):    Treatment Diagnosis:   No diagnosis found.     Medical Diagnosis:    Localized osteoarthritis of left knee [M17.12]  Left knee pain,

## 2024-03-11 ENCOUNTER — APPOINTMENT (OUTPATIENT)
Dept: PHYSICAL THERAPY | Age: 64
End: 2024-03-11
Payer: COMMERCIAL

## 2024-03-14 ENCOUNTER — APPOINTMENT (OUTPATIENT)
Dept: PHYSICAL THERAPY | Age: 64
End: 2024-03-14
Payer: COMMERCIAL

## 2024-03-19 ENCOUNTER — HOSPITAL ENCOUNTER (OUTPATIENT)
Dept: PHYSICAL THERAPY | Age: 64
Discharge: HOME | End: 2024-03-19

## 2024-03-19 PROCEDURE — 9990000027 HC GAP GROUP

## 2024-03-19 NOTE — FLOWSHEET NOTE
[facility] -- Orthopedic and Sports Medicine [place],   Sports Performance and Rehabilitation, [place] [address] [place], [place] [address]   Phone: [phone]       Date:  3/19/2024    Patient Name:  Amina Sanon    :  1960  MRN: [id number]  Restrictions/Precautions:  kneeling   Medical/Treatment Diagnosis Information: Localized osteoarthritis of left knee [M17.12]   Physician Information:   Dr. Babcock     Patient is Post-Op [] Yes   [x] No     DOS:      Visit Number:  $40  Billed Date:  3/19/23     Subjective: Amina presents to [facility] upon discharge from PT and recommendation for low impact strength training. She reports that she has goals of increasing strength and decreasing body fat percentage. She struggles with confidence in her knee and does not feel comfortable with the stability of her knee.      Objective: See PT discharge note       Exercises: Sets/Reps/Weight:   DL press   Wide stand press  Out on 2 in on 1    2R1B1Y 20x   Heel raise  SL heel raise   2R 20x   Core:  Arm press  Curl ups  Knee drop aways   SL Kick w/ opposite leg supported @ bar    1R1Y 10x   DL bridge  2R 10x                            Other Therapeutic Activities:   Hip flexor long lunge stretch   Standing pigeon stretch  Hamstring stretch     Home Exercise Program:   See PT note     Patient Education:      Cueing for breath timing and TA activation with each exercise     Assessment:  [x] Patient tolerated treatment well     [x] Patient limited by fatigue  [] Patient limited by pain                    [] Patient limited by other medical complications  [] Other:      Prognosis:     [x] Good          [] Fair             [] Poor     Patient Requires Follow-up:            [x] Yes             [] No     Plan:      [x] Continue per plan of care  [] Alter current plan (see comments)  [x] Plan of care initiated          [] Hold pending MD visit        [] Discharge       Electronically signed by:  Vicenta

## 2024-03-25 ENCOUNTER — HOSPITAL ENCOUNTER (OUTPATIENT)
Dept: PHYSICAL THERAPY | Age: 64
Discharge: HOME | End: 2024-03-25

## 2024-03-25 PROCEDURE — 9990000029 HC GAP PACKAGE

## 2024-03-25 NOTE — FLOWSHEET NOTE
[facility] -- Orthopedic and Sports Medicine [place],   Sports Performance and Rehabilitation, [place] [address] [place], [place] [address]   Phone: [phone]        Date:  3/19/2024     Patient Name:  Amina Sanon                     :  1960                     MRN: [id number]  Restrictions/Precautions:  kneeling   Medical/Treatment Diagnosis Information: Localized osteoarthritis of left knee [M17.12]   Physician Information:   Dr. Babcock      Patient is Post-Op [] Yes   [x] No     DOS:        Visit Number:   Billed Date:  $40 3/19/23, $215 3/25/24     Subjective: Amina presents to [facility] upon discharge from PT and recommendation for low impact strength training. She reports that she has goals of increasing strength and decreasing body fat percentage. She struggles with confidence in her knee and does not feel comfortable with the stability of her knee.      Objective: See PT discharge note         Exercises: Sets/Reps/Weight:   DL press   Wide stand press  Out on 2 in on 1     2R1B1Y 20x   Heel raise  SL heel raise    2R 20x   Core:  Arm press  Curl ups returning feet to bar with each return  Knee drop aways   SL Kick w/ opposite leg supported @ bar     1R1Y 10x   DL bridge   DL eccentric bridge 2R1B 10x        Standing SL press back   1R1B 15x                              Other Therapeutic Activities:   Hip flexor long lunge stretch   Standing pigeon stretch  Hamstring stretch     Home Exercise Program:   See PT note     Patient Education:      Cueing for breath timing and TA activation with each exercise     Assessment:  [x] Patient tolerated treatment well     [x] Patient limited by fatigue  [] Patient limited by pain                    [] Patient limited by other medical complications  [] Other:      Prognosis:     [x] Good          [] Fair             [] Poor     Patient Requires Follow-up:            [x] Yes             [] No     Plan:      [x] Continue per plan of

## 2024-03-27 ENCOUNTER — HOSPITAL ENCOUNTER (OUTPATIENT)
Dept: PHYSICAL THERAPY | Age: 64
Discharge: HOME | End: 2024-03-27

## 2024-03-27 NOTE — FLOWSHEET NOTE
complications  [] Other:      Prognosis:     [x] Good          [] Fair             [] Poor     Patient Requires Follow-up:            [x] Yes             [] No     Plan:      [x] Continue per plan of care  [] Alter current plan (see comments)  [x] Plan of care initiated          [] Hold pending MD visit        [] Discharge        Electronically signed by:  Vicenta Ravi MS, ATC     Tx was performed by ATC as a part of the GAP program following PT's recommendations/guidance.

## 2024-04-01 ENCOUNTER — HOSPITAL ENCOUNTER (OUTPATIENT)
Dept: PHYSICAL THERAPY | Age: 64
Discharge: HOME | End: 2024-04-01

## 2024-04-01 NOTE — FLOWSHEET NOTE
[facility] -- Orthopedic and Sports Medicine [place],   Sports Performance and Rehabilitation, [place] [address] [place], [place] [address]   Phone: [phone]        Date:  3/19/2024     Patient Name:  Amina Sanon                     :  1960                     MRN: [id number]  Restrictions/Precautions:  kneeling   Medical/Treatment Diagnosis Information: Localized osteoarthritis of left knee [M17.12]   Physician Information:   Dr. Babcock      Patient is Post-Op [] Yes   [x] No     DOS:        Visit Number: 3/6  Billed Date:  $40 3/19/23, $215 3/25/24     Subjective: Amina presents to [facility] upon discharge from PT and recommendation for low impact strength training. She reports that she has goals of increasing strength and decreasing body fat percentage. She struggles with confidence in her knee and does not feel comfortable with the stability of her knee.     Today she reports some increased tension and pain in the knee      Objective: See PT discharge note         Exercises: Sets/Reps/Weight:   DL press   Wide stand press  Out on 2 in on 1     2R1B1Y 20x   Heel raise  SL heel raise    2R 20x   Core:  Arm press  Curl ups returning feet to bar with each return  Knee drop aways w/  SL Kick w/ opposite leg tsupported @ bar     1R1Y 10x   DL bridge   DL eccentric bridge   2R1B 10x       Standing SL press back     1R1B 15x   *add plank series next session        short box seated reverse  Rows   Rows @90   1st to 2nd  Tricep   Bicep curl       1R1Y 10x  1R 10x  1R 10x  1B 10x  1R 10x      short box seated fwd  Serving  2nd to 1st  [place] diagonal press      1R 10x                  Other Therapeutic Activities:   Hip flexor long lunge stretch   Standing pigeon stretch  Hamstring stretch     Home Exercise Program:   See PT note     Patient Education:      Cueing for breath timing and TA activation with each exercise     Assessment:  [x] Patient tolerated treatment well     [x] Patient

## 2024-04-03 ENCOUNTER — HOSPITAL ENCOUNTER (OUTPATIENT)
Dept: PHYSICAL THERAPY | Age: 64
Discharge: HOME | End: 2024-04-03

## 2024-04-03 NOTE — FLOWSHEET NOTE
[facility] -- Orthopedic and Sports Medicine [place],   Sports Performance and Rehabilitation, [place] [address] [place], [place] [address]   Phone: [phone]        Date:  3/19/2024     Patient Name:  Amina Sanon                     :  1960                     MRN: [id number]  Restrictions/Precautions:  kneeling   Medical/Treatment Diagnosis Information: Localized osteoarthritis of left knee [M17.12]   Physician Information:   Dr. Babcock      Patient is Post-Op [] Yes   [x] No     DOS:        Visit Number: 3/6  Billed Date:  $40 3/19/23, $215 3/25/24     Subjective: Amina presents to [facility] upon discharge from PT and recommendation for low impact strength training. She reports that she has goals of increasing strength and decreasing body fat percentage. She struggles with confidence in her knee and does not feel comfortable with the stability of her knee.     Today she reports some increased tension and pain in the knee      Objective: See PT discharge note         Exercises: Sets/Reps/Weight:   DL press   Wide stand press  Out on 2 in on 1   SL Press out   SL press out w/ SL kick     2R1B1Y 20x   Heel raise  SL heel raise    2R 20x   Core:  Arm press  Curl ups returning feet to bar with each return  Knee drop aways w/    1R1Y 10x   DL bridge   DL eccentric bridge   2R1B 10x       Standing SL press back     1R1B 15x   Modified plank   1R    short box seated reverse  Rows   Rows @90   1st to 2nd  Tricep   Bicep curl     *Prone long box tricep next session     1R1B 10x  1R 10x  1R 10x  1B 10x  1R 10x      short box seated fwd  Serving  2nd to 1st  [place] diagonal press      1R 10x                  Other Therapeutic Activities:   Hip flexor long lunge stretch   Standing pigeon stretch  Hamstring stretch     Home Exercise Program:   See PT note     Patient Education:      Cueing for breath timing and TA activation with each exercise     Assessment:  [x] Patient tolerated treatment

## 2024-04-09 ENCOUNTER — HOSPITAL ENCOUNTER (OUTPATIENT)
Dept: PHYSICAL THERAPY | Age: 64
Discharge: HOME | End: 2024-04-09

## 2024-04-09 NOTE — FLOWSHEET NOTE
each exercise     Assessment:  [x] Patient tolerated treatment well     [x] Patient limited by fatigue  [x] Patient limited by pain                    [] Patient limited by other medical complications  [] Other:      Prognosis:     [x] Good          [] Fair             [] Poor     Patient Requires Follow-up:            [x] Yes             [] No     Plan:      [x] Continue per plan of care  [] Alter current plan (see comments)  [x] Plan of care initiated          [] Hold pending MD visit        [] Discharge        Electronically signed by:  Vicenta Ravi MS, GABY     Tx was performed by GABY as a part of the GAP program following PT's recommendations/guidance.

## 2024-04-11 ENCOUNTER — HOSPITAL ENCOUNTER (OUTPATIENT)
Dept: PHYSICAL THERAPY | Age: 64
Discharge: HOME | End: 2024-04-11

## 2024-04-11 NOTE — FLOWSHEET NOTE
[facility] -- Orthopedic and Sports Medicine [place],   Sports Performance and Rehabilitation, [place] [address] [place], [place] [address]   Phone: [phone]        Date:  3/19/2024     Patient Name:  Amina Sanon                     :  1960                     MRN: [id number]  Restrictions/Precautions:  kneeling   Medical/Treatment Diagnosis Information: Localized osteoarthritis of left knee [M17.12]   Physician Information:   Dr. Babcock      Patient is Post-Op [] Yes   [x] No     DOS:        Visit Number:   Billed Date:  $40 3/19/23, $215 3/25/24     Subjective: [facility] from PT and recommendation for low impact strength training. She reports that she has goals of increasing strength and decreasing body fat percentage. She struggles with confidence in her knee and does not feel comfortable with the stability of her knee.     Today she reports she's feeling good. No soreness following last session.      Objective: See PT discharge note         Exercises: Sets/Reps/Weight:   DL press   Wide stand press  Out on 2 in on 1   SL Press out   SL press out w/ SL kick     2R1B1Y 20x   Heel raise  SL heel raise    2R 20x   Core:  Arm press down  Curl ups returning feet to bar with each return  Knee drop away  bicycles    1R1Y 10x   DL bridge   DL eccentric bridge   2R1B 10x       Legs in loops   II  ER  IR  Circles  Frog   1R1B 15x    short box seated reverse  Rows   Rows @90   1st to 2nd  Tricep   Bicep curl       1R1B 10x  1R 10x  1R 10x  1B 10x  1R 10x            1R 10x                  Other Therapeutic Activities:   Hip flexor long lunge stretch   Standing pigeon stretch  Hamstring stretch     Home Exercise Program:   See PT note     Patient Education:      Cueing for breath timing and TA activation with each exercise     Assessment:  [x] Patient tolerated treatment well     [x] Patient limited by fatigue  [x] Patient limited by pain

## 2024-04-16 ENCOUNTER — HOSPITAL ENCOUNTER (OUTPATIENT)
Dept: PHYSICAL THERAPY | Age: 64
Discharge: HOME | End: 2024-04-16

## 2024-04-16 PROCEDURE — 9990000029 HC GAP PACKAGE

## 2024-04-16 NOTE — FLOWSHEET NOTE
[facility] -- Orthopedic and Sports Medicine [place],   Sports Performance and Rehabilitation, [place] [address] [place], [place] [address]   Phone: [phone]        Date:  3/19/2024     Patient Name:  Amina Sanon                     :  1960                     MRN: [id number]  Restrictions/Precautions:  kneeling   Medical/Treatment Diagnosis Information: Localized osteoarthritis of left knee [M17.12]   Physician Information:   Dr. Babcock      Patient is Post-Op [] Yes   [x] No     DOS:        Visit Number:   Billed Date:  $40 3/19/23, $215 3/25/24, 24     Subjective: [facility] from PT and recommendation for low impact strength training. She reports that she has goals of increasing strength and decreasing body fat percentage. She struggles with confidence in her knee and does not feel comfortable with the stability of her knee.     Today she reports she's feeling good. No soreness following last session.      Objective: See PT discharge note         Exercises: Sets/Reps/Weight:   DL press   Wide stand press  Out on 2 in on 1   SL Press out   SL press out w/ SL kick     2R1B1Y 20x   Heel raise  SL heel raise    2R 20x   Core:  Arm press down  Curl ups returning feet to bar with each return  Knee drop away  bicycles    1R1Y 10x   DL bridge   DL eccentric bridge   2R1B 10x       Legs in loops   II  ER  IR  Circles  Frog   1R1B 15x    short box seated reverse  Rows   Rows @90   1st to 2nd  Tricep   Bicep curl       1R1B 10x  1R 10x  1R 10x  1B 10x  1R 10x            1R 10x                  Other Therapeutic Activities:   Hip flexor long lunge stretch   Standing pigeon stretch  Hamstring stretch     Home Exercise Program:   See PT note     Patient Education:      Cueing for breath timing and TA activation with each exercise     Assessment:  [x] Patient tolerated treatment well     [x] Patient limited by fatigue  [x] Patient limited by pain

## 2024-04-18 ENCOUNTER — HOSPITAL ENCOUNTER (OUTPATIENT)
Dept: PHYSICAL THERAPY | Age: 64
Discharge: HOME | End: 2024-04-18

## 2024-04-18 NOTE — FLOWSHEET NOTE
[facility] -- Orthopedic and Sports Medicine [place],   Sports Performance and Rehabilitation, [place] [address] [place], [place] [address]   Phone: [phone]        Date:  3/19/2024     Patient Name:  Amina Sanon                     :  1960                     MRN: [id number]  Restrictions/Precautions:  kneeling   Medical/Treatment Diagnosis Information: Localized osteoarthritis of left knee [M17.12]   Physician Information:   Dr. Babcock      Patient is Post-Op [] Yes   [x] No     DOS:        Visit Number:   Billed Date:  $40 3/19/23, $215 3/25/24, 24     Subjective: Amina presents to [facility] upon discharge from PT and recommendation for low impact strength training. She reports that she has goals of increasing strength and decreasing body fat percentage. She struggles with confidence in her knee and does not feel comfortable with the stability of her knee.     Today she reports she's feeling good. No soreness following last session. Would like continue to progress her exercises.      Objective: See PT discharge note         Exercises: Sets/Reps/Weight:   DL press   Wide stand press  Out on 2 in on 1   SL Press out   SL press out w/ SL kick     2R1B1Y 20x   Heel raise  SL heel raise    2R1B 20x  1R1B   Core:  Arm press down  Curl ups returning feet to bar with each return  Knee drop away  bicycles    1R1Y 10x   DL bridge   DL eccentric bridge   2R1B 10x   2R   Legs in loops   II  ER  IR  Circles  Frog   1R1B 15x    short box seated reverse  Rows   Rows @90   1st to 2nd  Tricep   ER  Bicep curl       2R 10x  1R 10x  1R 10x  1B 10x  1B 10x  1R 10x            1R 10x                  Other Therapeutic Activities:   Hip flexor long lunge stretch   Standing pigeon stretch  Hamstring stretch     Home Exercise Program:   See PT note     Patient Education:      Cueing for breath timing and TA activation with each exercise     Assessment:  [x] Patient tolerated treatment well

## 2024-04-22 ENCOUNTER — HOSPITAL ENCOUNTER (OUTPATIENT)
Dept: PHYSICAL THERAPY | Age: 64
Discharge: HOME | End: 2024-04-22

## 2024-04-22 NOTE — FLOWSHEET NOTE
[x] Patient limited by fatigue  [x] Patient limited by pain                    [] Patient limited by other medical complications  [] Other:      Prognosis:     [x] Good          [] Fair             [] Poor     Patient Requires Follow-up:            [x] Yes             [] No     Plan:      [x] Continue per plan of care  [] Alter current plan (see comments)  [x] Plan of care initiated          [] Hold pending MD visit        [] Discharge        Electronically signed by:  Vicenta Ravi MS, ATC     Tx was performed by GABY as a part of the GAP program following PT's recommendations/guidance.

## 2024-04-24 ENCOUNTER — HOSPITAL ENCOUNTER (OUTPATIENT)
Dept: PHYSICAL THERAPY | Age: 64
Discharge: HOME | End: 2024-04-24

## 2024-04-24 NOTE — FLOWSHEET NOTE
[facility] -- Orthopedic and Sports Medicine [place],   Sports Performance and Rehabilitation, [place] [address] [place], [place] [address]   Phone: [phone]        Date:  3/19/2024     Patient Name:  Amina Sanon                     :  1960                     MRN: [id number]  Restrictions/Precautions:  kneeling   Medical/Treatment Diagnosis Information: Localized osteoarthritis of left knee [M17.12]   Physician Information:   Dr. Babcock      Patient is Post-Op [] Yes   [x] No     DOS:        Visit Number:   Billed Date:  $40 3/19/23, $215 3/25/24, 24,      Subjective: [facility] and recommendation for low impact strength training. She reports that she has goals of increasing strength and decreasing body fat percentage. She struggles with confidence in her knee and does not feel comfortable with the stability of her knee.     Today she reports she's feeling good. No soreness following last session. Would like continue to progress her exercises.      Objective: See PT discharge note         Exercises: Sets/Reps/Weight:   DL press   Wide stand press  Out on 2 in on 1   SL Press out   SL press out w/ SL kick     2R1B1Y 20x   Heel raise  SL heel raise    2R1B 20x  1R1B   Core:  Arm press down  Curl ups returning feet to bar with each return  Knee drop away  Bicycles  SL raises    1R1Y 10x   DL bridge   Bridge marching   DL eccentric bridge     2R1B 10x     2R     Legs in loops   II  ER  IR  Circles  Frog   1R1B 15x   Standing SL press back     1R1B 15x   Modified plank   1R    short box seated reverse  Rows   Rows @90   1st to 2nd  Tricep   ER  Bicep curl       2R 10x  1R 10x  1R 10x  1B 10x  1B 10x  1R 10x            1R 10x    airex SL balance   3x30s             Other Therapeutic Activities:   Hip flexor long lunge stretch   Standing pigeon stretch  Hamstring stretch     Home Exercise Program:   See PT note     Patient Education:

## 2024-05-07 ENCOUNTER — HOSPITAL ENCOUNTER (OUTPATIENT)
Dept: PHYSICAL THERAPY | Age: 64
Discharge: HOME | End: 2024-05-07

## 2024-05-07 PROCEDURE — 9990000029 HC GAP PACKAGE

## 2024-05-07 NOTE — FLOWSHEET NOTE
[facility] -- Orthopedic and Sports Medicine [place],   Sports Performance and Rehabilitation, [place] [address] [place], [place] [address]   Phone: [phone]        Date:  3/19/2024     Patient Name:  Amina Sanon                     :  1960                     MRN: [id number]  Restrictions/Precautions:  kneeling   Medical/Treatment Diagnosis Information: Localized osteoarthritis of left knee [M17.12]   Physician Information:   Dr. Babcock      Patient is Post-Op [] Yes   [x] No     DOS:        Visit Number:   Billed Date:  $40 3/19/23, $215 3/25/24, 24, 24     Subjective: [facility] from PT and recommendation for low impact strength training. She reports that she has goals of increasing strength and decreasing body fat percentage. She struggles with confidence in her knee and does not feel comfortable with the stability of her knee.     Today she reports she's feeling good. No soreness following last session.       Objective: See PT discharge note         Exercises: Sets/Reps/Weight:   DL press   Wide stand press  Out on 2 in on 1   SL Press out   SL press out w/ SL kick     2R1B1Y 20x   Heel raise  SL heel raise    2R1B 20x  1R1B   Core:  Arm press down  Curl ups returning feet to bar with each return  Knee drop away  Bicycles  SL raises    1R1Y 10x   DL bridge   Bridge marching   DL eccentric bridge     2R1B 10x     2R     Legs in loops   II  ER  IR  Circles  Frog   1R1B 15x   Standing SL press back     1R1B 15x   Modified plank   1R    short box seated reverse  Rows   Rows @90   1st to 2nd  Tricep   ER  Bicep curl       2R 10x  1R 10x  1R 10x  1B 10x  1B 10x  1R 10x            1R 10x    airex SL balance   3x30s             Other Therapeutic Activities:   Hip flexor long lunge stretch   Standing pigeon stretch  Hamstring stretch     Home Exercise Program:   See PT note     Patient Education:      Cueing for breath timing and TA

## 2024-05-09 ENCOUNTER — HOSPITAL ENCOUNTER (OUTPATIENT)
Dept: PHYSICAL THERAPY | Age: 64
Discharge: HOME | End: 2024-05-09

## 2024-05-09 NOTE — FLOWSHEET NOTE
[facility] -- Orthopedic and Sports Medicine [place],   Sports Performance and Rehabilitation, [place] [address] [place], [place] [address]   Phone: [phone]        Date:  3/19/2024     Patient Name:  Amina Sanon                     :  1960                     MRN: [id number]  Restrictions/Precautions:  kneeling   Medical/Treatment Diagnosis Information: Localized osteoarthritis of left knee [M17.12]   Physician Information:   Dr. Babcock      Patient is Post-Op [] Yes   [x] No     DOS:        Visit Number:   Billed Date:  $40 3/19/23, $215 3/25/24, 24, 24     Subjective: [facility] from PT and recommendation for low impact strength training. She reports that she has goals of increasing strength and decreasing body fat percentage. She struggles with confidence in her knee and does not feel comfortable with the stability of her knee.     Today she reports she's feeling good. No soreness following last session.       Objective: See PT discharge note         Exercises: Sets/Reps/Weight:   DL press  Wide stand press  Out on 2 in on 1   SL Press out   SL press out w/ SL kick     2R1B1Y 20x   Heel raise  SL heel raise    2R1B 20x  1R1B   Core:  Arm press down  Curl ups returning feet to bar with each return  Knee drop away  Bicycles  SL raises    1R1Y 10x   DL bridge   Bridge marching   DL eccentric bridge     2R1B 10x     2R      1R1B 15x   Standing SL press back     1R1B 15x   Modified plank   1R    short box seated reverse  Rows   Rows @90   1st to 2nd  Tricep   ER  Bicep curl       2R1B 10x  1R1B 10x  1R 10x  1B 10x  1B 10x  1R1Y 10x            1R 10x            Other Therapeutic Activities:   Quad stretch  ITB stretch     Home Exercise Program:   See PT note     Patient Education:      Cueing for breath timing and TA activation with each exercise     Assessment:  [x] Patient tolerated treatment well     [x] Patient limited by

## 2024-05-14 ENCOUNTER — HOSPITAL ENCOUNTER (OUTPATIENT)
Dept: PHYSICAL THERAPY | Age: 64
Discharge: HOME | End: 2024-05-14

## 2024-05-14 NOTE — FLOWSHEET NOTE
[facility] [place] -- Orthopedic and Sports Medicine [place],   Sports Performance and Rehabilitation, [place] [address] [place], [place] [address]   Phone: [phone]        Date:  3/19/2024     Patient Name:  Amina Sanon                     :  1960                     MRN: [id number]  Restrictions/Precautions:  kneeling   Medical/Treatment Diagnosis Information: Localized osteoarthritis of left knee [M17.12]   Physician Information:   Dr. Babcock      Patient is Post-Op [] Yes   [x] No     DOS:        Visit Number: 3/4  Billed Date:  $40 3/19/23, $215 3/25/24, 24, 24     Subjective: [facility] from PT and recommendation for low impact strength training. She reports that she has goals of increasing strength and decreasing body fat percentage. She struggles with confidence in her knee and does not feel comfortable with the stability of her knee.     Today she reports she's feeling good. No soreness following last session.       Objective: See PT discharge note         Exercises: Sets/Reps/Weight:   DL press  Wide stand press  Out on 2 in on 1   SL Press out   SL press out w/ SL kick     2R1B1Y 20x   Heel raise  SL heel raise    2R1B 20x  1R1B   Core:  Arm press down  Curl ups returning feet to bar with each return  Knee drop away  Bicycles  SL raises    1R1Y 10x   DL bridge   Bridge marching   DL eccentric bridge   2R1B 10x     2R     Legs in loops   II  ER  IR  Circles  Frog   1R1B 15x    1R1B 15x   1R    short box seated reverse  Rows   Rows @90   1st to 2nd  Tricep   ER  Bicep curl       2R1B 10x  1R1B 10x  1R 10x  1B 10x  1B 10x  1R1Y 10x            1R 10x            Other Therapeutic Activities:   Quad stretch  ITB stretch     Home Exercise Program:   See PT note     Patient Education:      Cueing for breath timing and TA activation with each exercise     Assessment:  [x] Patient tolerated treatment well     [x] Patient limited by fatigue  [x]

## 2024-05-21 ENCOUNTER — HOSPITAL ENCOUNTER (OUTPATIENT)
Dept: PHYSICAL THERAPY | Age: 64
Discharge: HOME | End: 2024-05-21

## 2024-05-28 ENCOUNTER — HOSPITAL ENCOUNTER (OUTPATIENT)
Dept: PHYSICAL THERAPY | Age: 64
Discharge: HOME | End: 2024-05-28

## 2024-05-28 NOTE — FLOWSHEET NOTE
[facility] -- Orthopedic and Sports Medicine [place],   Sports Performance and Rehabilitation, [place] [address] [place], [place] [address]   Phone: [phone]        Date:  3/19/2024     Patient Name:  Amina Sanon                     :  1960                     MRN: [id number]  Restrictions/Precautions:  kneeling   Medical/Treatment Diagnosis Information: Localized osteoarthritis of left knee [M17.12]   Physician Information:   Dr. Babcock      Patient is Post-Op [] Yes   [x] No     DOS:        Visit Number:   Billed Date:  $40 3/19/23, $215 3/25/24, 24, 24     Subjective: [facility] from [facility] and recommendation for low impact strength training. She reports that she has goals of increasing strength and decreasing body fat percentage. She struggles with confidence in her knee and does not feel comfortable with the stability of her knee.     Today she reports she's feeling good. No soreness following last session.       Objective: See PT discharge note         Exercises: Sets/Reps/Weight:    Pedaling  DL press  Wide stand press  Out on 2 in on 1   SL Press out   SL press out w/ SL kick     2R1B   2R1B1Y 20x   Heel raise  SL heel raise    2R1B 20x  1R1B   Core:  Arm press down  Curl ups returning feet to bar with each return  Knee drop away  Bicycles  SL raises    1R1Y 10x   DL bridge   Bridge marching   DL eccentric bridge   2R1B 10x     2R     Legs in loops   II  ER  IR  Circles  Frog   1R1B 15x    1R1B 15x   1R    short box seated reverse  Rows   Rows @90   1st to 2nd  Tricep   ER  Bicep curl     Side seated IR    2R1B 10x  2R 10x  1R 10x  1B 10x  1B 10x  1R1Y 10x    1B1Y          1R 10x            Other Therapeutic Activities:   Quad stretch  ITB stretch     Home Exercise Program:   See PT note     Patient Education:      Cueing for breath timing and TA activation with each exercise     Assessment:  [x] Patient tolerated treatment well

## 2024-06-11 ENCOUNTER — HOSPITAL ENCOUNTER (OUTPATIENT)
Dept: PHYSICAL THERAPY | Age: 64
Discharge: HOME | End: 2024-06-11

## 2024-06-11 PROCEDURE — 9990000029 HC GAP PACKAGE

## 2024-06-11 NOTE — FLOWSHEET NOTE
[facility] -- Orthopedic and Sports Medicine [place],   Sports Performance and Rehabilitation, [place] [address] [place], [place] [address]   Phone: [phone]        Date:  3/19/2024     Patient Name:  Amina Sanon                     :  1960                     MRN: [id number]  Restrictions/Precautions:  kneeling   Medical/Treatment Diagnosis Information: Localized osteoarthritis of left knee [M17.12]   Physician Information:   Dr. Babcock      Patient is Post-Op [] Yes   [x] No     DOS:        Visit Number: 3/4  Billed Date:  $40 3/19/23, $215 3/25/24, 24, 24     Subjective: [facility] from PT and recommendation for low impact strength training. She reports that she has goals of increasing strength and decreasing body fat percentage. She struggles with confidence in her knee and does not feel comfortable with the stability of her knee.     Today she reports she's feeling good. No soreness following last session. Would like continue to progress her exercises.      Objective: See PT discharge note         Exercises: Sets/Reps/Weight:   DL press   Wide stand press  Out on 2 in on 1   SL Press out   SL press out w/ SL kick     2R1B1G 20x   Heel raise  SL heel raise    2R1B 20x  1R1B   Core:  Arm press down  Curl ups returning feet to bar with each return  Knee drop away  bicycles    1R1Y 10x   DL bridge   DL eccentric bridge  Bridge w/ marching   2R1B 10x   2R  2R1G   Legs in loops   II  ER  IR  Circles  Frog   1R1B 15x    short box seated reverse  Rows   Rows @90   1st to 2nd  Tricep   ER  Bicep curl       2R 10x  1R 10x  1R 10x  1B 10x  1B 10x  1R 10x            1R 10x                  Other Therapeutic Activities:   Hip flexor long lunge stretch   Standing pigeon stretch  Hamstring stretch     Home Exercise Program:   See PT note     Patient Education:      Cueing for breath timing and TA activation with each exercise     Assessment:  [x]

## 2024-06-13 ENCOUNTER — OFFICE VISIT (OUTPATIENT)
Dept: ORTHOPEDIC SURGERY | Age: 64
End: 2024-06-13

## 2024-06-13 ENCOUNTER — HOSPITAL ENCOUNTER (OUTPATIENT)
Dept: PHYSICAL THERAPY | Age: 64
Discharge: HOME | End: 2024-06-13

## 2024-06-13 VITALS — WEIGHT: 179 LBS | HEIGHT: 65 IN | BODY MASS INDEX: 29.82 KG/M2

## 2024-06-13 DIAGNOSIS — M76.32 ILIOTIBIAL BAND TENDINITIS OF LEFT SIDE: ICD-10-CM

## 2024-06-13 DIAGNOSIS — M17.12 LOCALIZED OSTEOARTHRITIS OF LEFT KNEE: Primary | ICD-10-CM

## 2024-06-13 DIAGNOSIS — M25.562 LEFT KNEE PAIN, UNSPECIFIED CHRONICITY: ICD-10-CM

## 2024-06-13 RX ORDER — METHYLPREDNISOLONE ACETATE 40 MG/ML
40 INJECTION, SUSPENSION INTRA-ARTICULAR; INTRALESIONAL; INTRAMUSCULAR; SOFT TISSUE ONCE
Status: COMPLETED | OUTPATIENT
Start: 2024-06-13 | End: 2024-06-13

## 2024-06-13 RX ORDER — METHYLPREDNISOLONE 4 MG/1
TABLET ORAL
Qty: 1 KIT | Refills: 0 | Status: SHIPPED | OUTPATIENT
Start: 2024-06-13 | End: 2024-06-19

## 2024-06-13 RX ADMIN — METHYLPREDNISOLONE ACETATE 40 MG: 40 INJECTION, SUSPENSION INTRA-ARTICULAR; INTRALESIONAL; INTRAMUSCULAR; SOFT TISSUE at 15:19

## 2024-06-13 RX ADMIN — Medication 2 ML: at 15:18

## 2024-06-13 NOTE — FLOWSHEET NOTE
[facility] -- Orthopedic and Sports Medicine [place],   Sports Performance and Rehabilitation, [place] [address] [place], [place] [address]   Phone: [phone]        Date:  3/19/2024     Patient Name:  Amina Sanon                     :  1960                     MRN: [id number]  Restrictions/Precautions:  kneeling   Medical/Treatment Diagnosis Information: Localized osteoarthritis of left knee [M17.12]   Physician Information:   Dr. Babcock      Patient is Post-Op [] Yes   [x] No     DOS:        Visit Number:   Billed Date:  $40 3/19/23, $215 3/25/24, 24, 24     Subjective: [facility] and recommendation for low impact strength training. She reports that she has goals of increasing strength and decreasing body fat percentage. She struggles with confidence in her knee and does not feel comfortable with the stability of her knee.     Today she reports she's feeling good. No soreness following last session. Would like continue to progress her exercises.      Objective: See PT discharge note         Exercises: Sets/Reps/Weight:   DL press   Wide stand press  Out on 2 in on 1   SL Press out   SL press out w/ SL kick     2R1B1G 20x   Heel raise  SL heel raise    2R1B 20x  1R1B   Core:  Arm press down  Curl ups returning feet to bar with each return  Knee drop away  bicycles    1R1Y 10x   DL bridge   DL eccentric bridge  Bridge w/ marching   2R1B 10x   2R  2R1G   Legs in loops   II  ER  IR  Circles  Frog   1R1B 15x    short box seated reverse  Rows   Rows @90   1st to 2nd  Tricep   ER  Bicep curl       2R 10x  1R 10x  1R 10x  1B 10x  1B 10x  1R 10x            1R 10x                  Other Therapeutic Activities:   Hip flexor long lunge stretch   Standing pigeon stretch  Hamstring stretch     Home Exercise Program:   See PT note     Patient Education:      Cueing for breath timing and TA activation with each exercise     Assessment:  [x]

## 2024-06-15 NOTE — PROGRESS NOTES
Chief Complaint    Knee Pain (Ck left knee/)      History of Present Illness:  Amina Sanon is a 63 y.o. female who presents for follow-up evaluation of her left knee.  This patient is known to [facility] orthopedics and being treated conservatively for the arthritis in her left knee.  She does exhibit some symptoms from left peroneal nerve entrapment but she feels like these have been improving.  Conservative treatment for her arthritis has included: rest, ice, elevation, bracing, physical therapy, home exercises, activity modification, OTC medications as needed, and corticosteroid injections.  She feels like physical therapy has been helping.  She notes some pain to the lateral joint line and IT band.  See pain assessment below for more details.  The patient denies any new injury. The patient denies any numbness, paresthesias, or weakness.       Pain Assessment  Location of Pain: Knee  Location Modifiers: Left  Severity of Pain: 2  Quality of Pain: Aching  Duration of Pain: Persistent  Frequency of Pain: Intermittent  Aggravating Factors: Standing, Walking, Stairs, Bending, Straightening  Limiting Behavior: Yes  Relieving Factors: Rest  Result of Injury: No  Work-Related Injury: No  Are there other pain locations you wish to document?: No    Physical exam:    Knee Examination: Left     Inspection:   No effusion, ecchymosis, discoloration, erythema, excessive warmth. no gross deformities, no signs of infection.      Palpation: There is mild patellofemoral crepitus, there is mild medial joint line tenderness.  Tenderness over the distal IT band.  No other osseous or soft tissue tenderness.  Negative calf tenderness     Range of Motion:  0-125 degrees without pain or difficulty. Appropriate patellar mobility.     Strength: Grossly intact     Special Tests:   Valgus and varus stress test are stable at 0 and 30°.   Negative anterior and posterior drawer.  Negative Homans sign     Gait: Non antalgic, without the use of

## 2024-06-19 ENCOUNTER — HOSPITAL ENCOUNTER (OUTPATIENT)
Dept: PHYSICAL THERAPY | Age: 64
Discharge: HOME | End: 2024-06-19

## 2024-06-19 NOTE — FLOWSHEET NOTE
[facility] -- Orthopedic and Sports Medicine [place],   Sports Performance and Rehabilitation, [place] [address] [place], [place] [address]   Phone: [phone]        Date:  3/19/2024     Patient Name:  Amina Sanon                     :  1960                     MRN: [id number]  Restrictions/Precautions:  kneeling   Medical/Treatment Diagnosis Information: Localized osteoarthritis of left knee [M17.12]   Physician Information:   Dr. Babcock      Patient is Post-Op [] Yes   [x] No     DOS:        Visit Number:   Billed Date:  $40 3/19/23, $215 3/25/24, 24, 24, OWES     Subjective: [facility]a [facility] discharge from PT and recommendation for low impact strength training. She reports that she has goals of increasing strength and decreasing body fat percentage. She struggles with confidence in her knee and does not feel comfortable with the stability of her knee.     Last week she has a CSI and has yet to feel the effects of the medication. She reports that she continues to feel some pain in the back of the knee especially when turning or twisting.      Objective: See PT discharge note         Exercises: Sets/Reps/Weight:   DL press   Wide stand press  Out on 2 in on 1   SL Press out   SL press out w/ SL kick     2R1B1G 20x   Heel raise  SL heel raise    2R1B 20x  1R1B   Core:  Arm press down  Curl ups returning feet to bar with each return  Knee drop away  Bicycles  Arm 1/2 open    1R1Y 10x   DL bridge   DL eccentric bridge  Bridge w/ marching   2R1B 10x   2R  2R1G   Legs in loops   II  ER  IR  Circles  Frog   1R1B 15x    short box seated reverse  Rows   Rows @90   Bicep curl  1st to 2nd  Tricep   ER         2R1B 10x  2R 10x    1R1Y 10x                1R 10x                  Other Therapeutic Activities:   Hip flexor long lunge stretch   Standing pigeon stretch  Hamstring stretch     Home Exercise Program:   See PT note     Patient Education:      Cueing for

## 2024-06-24 ENCOUNTER — HOSPITAL ENCOUNTER (OUTPATIENT)
Dept: PHYSICAL THERAPY | Age: 64
Discharge: HOME | End: 2024-06-24

## 2024-06-24 PROCEDURE — 9990000029 HC GAP PACKAGE

## 2024-06-24 NOTE — FLOWSHEET NOTE
[facility] -- Orthopedic and Sports Medicine [place],   Sports Performance and Rehabilitation, [place] [address] [place], [place] [address]   Phone: [phone]        Date:  3/19/2024     Patient Name:  Amina Sanon                     :  1960                     MRN: [id number]  Restrictions/Precautions:  pain as guide  Medical/Treatment Diagnosis Information: Localized osteoarthritis of left knee [M17.12]   Physician Information:   Dr. Babcock      Patient is Post-Op [] Yes   [x] No     DOS:        Visit Number:   Billed Date:  $40 3/19/23, $215 3/25/24, 24, 24, OWES     Subjective: [facility] from PT and recommendation for low impact strength training. She reports that she has goals of increasing strength and decreasing body fat percentage. She struggles with confidence in her knee and does not feel comfortable with the stability of her knee.     Today she reports that she's doing pretty well. A decrease in her pain however she has not been walking due to the heat.      Objective: See PT discharge note         Exercises: Sets/Reps/Weight:   DL press   Wide stand press  Out on 2 in on 1   SL Press out   SL press out w/ SL kick     2R1B1G 20x   Heel raise  SL heel raise    2R1B 20x  1R1B   Core:  Arm press down  Curl ups returning feet to bar with each return  Knee drop away  Bicycles  Arm 1/2 open    1R1Y 10x   DL bridge   DL eccentric bridge  Bridge w/ marching   2R1B 10x   2R  2R1G   Legs in loops   II  ER  IR  Circles  Frog  SL frog   1R1B 15x    short box seated reverse  Rows   Rows @90   Bicep curl  1st to 2nd  Tricep   ER      2R1B 10x  2R 10x    1R1Y 10x                1R 10x                  Other Therapeutic Activities:   Hip flexor long lunge stretch   Standing pigeon stretch  Hamstring stretch     Home Exercise Program:   See PT note     Patient Education:      Cueing for breath timing and TA activation with each exercise

## 2024-06-26 ENCOUNTER — HOSPITAL ENCOUNTER (OUTPATIENT)
Dept: PHYSICAL THERAPY | Age: 64
Discharge: HOME | End: 2024-06-26

## 2024-06-26 NOTE — FLOWSHEET NOTE
[facility] -- Orthopedic and Sports Medicine [place],   Sports Performance and Rehabilitation, [place] [address] [place], [place] [address]   Phone: [phone]        Date:  3/19/2024     Patient Name:  Amina Sanon                     :  1960                     MRN: [id number]  Restrictions/Precautions:  pain as guide  Medical/Treatment Diagnosis Information: Localized osteoarthritis of left knee [M17.12]   Physician Information:   Dr. Babcock      Patient is Post-Op [] Yes   [x] No     DOS:        Visit Number: 3/4  Billed Date:  24     Subjective: Amina presents to [facility] upon discharge from PT and recommendation for low impact strength training. She reports that she has goals of increasing strength and decreasing body fat percentage. She struggles with confidence in her knee and does not feel comfortable with the stability of her knee.     Today she reports that she's doing pretty well. A decrease in her pain however she has not been walking due to the heat.      Objective: See PT discharge note         Exercises: Sets/Reps/Weight:   DL press   Wide stand press  Out on 2 in on 1   SL Press out   SL press out w/ SL kick     2R1B1G 20x   Heel raise  SL heel raise    2R1B 20x  1R1B   Core:  Arm press down  Curl ups returning feet to bar with each return  Knee drop away  Bicycles  Arm 1/2 open    1R1Y 10x   DL bridge   DL eccentric bridge  Bridge w/ marching   2R1Y 10x   2R  2R1G   Legs in loops   II  ER  IR  Circles  Frog  SL frog  II SL openers   1R1B 15x    short box seated reverse  Rows   Rows @90   Bicep curl  1st to 2nd  Tricep   ER  Diagonal openers      2R1B 10x  2R 10x    1R1Y 10x                1R 10x                  Other Therapeutic Activities:   Hip flexor long lunge stretch   Standing pigeon stretch  Hamstring stretch     Home Exercise Program:   See PT note     Patient Education:      Cueing for breath timing and TA activation with each exercise

## 2024-07-02 ENCOUNTER — HOSPITAL ENCOUNTER (OUTPATIENT)
Dept: PHYSICAL THERAPY | Age: 64
Discharge: HOME | End: 2024-07-02

## 2024-07-02 NOTE — FLOWSHEET NOTE
[facility] -- Orthopedic and Sports Medicine [place],   Sports Performance and Rehabilitation, [place] [address] [place], [place] [address]   Phone: [phone]        Date:  3/19/2024     Patient Name:  Amina Sanon                     :  1960                     MRN: [id number]  Restrictions/Precautions:  pain as guide  Medical/Treatment Diagnosis Information: Localized osteoarthritis of left knee [M17.12]   Physician Information:   Dr. Babcock      Patient is Post-Op [] Yes   [x] No     DOS:        Visit Number:   Billed Date:  24     Subjective: Amina presents to [facility] upon discharge from PT and recommendation for low impact strength training. She reports that she has goals of increasing strength and decreasing body fat percentage. She struggles with confidence in her knee and does not feel comfortable with the stability of her knee.     Today she reports that she's doing pretty well. A decrease in her pain and was able to hike and walk more this past week.      Objective: See PT discharge note         Exercises: Sets/Reps/Weight:   DL press   Wide stand press  Out on 2 in on 1   SL Press out   SL press out w/ SL kick     2R1B1G 20x   Heel raise  SL heel raise    2R1B 20x  1R1B   Core:  Arm press down  Curl ups returning feet to bar with each return  Knee drop away  Bicycles  Arm 1/2 open    1R1Y 10x   DL bridge   DL eccentric bridge  Bridge w/ marching   2R1Y 10x   2R  2R1G   Legs in loops   II  ER  IR  Circles  Frog  SL frog  II SL openers   1R1B 15x    short box seated reverse  Rows   Rows @90   Bicep curl  1st to 2nd  Tricep   ER  Diagonal openers      2R1B 10x  2R 10x    1R1Y 10x                1R 10x                  Other Therapeutic Activities:   Hip flexor long lunge stretch   Standing pigeon stretch  Hamstring stretch     Home Exercise Program:   See PT note     Patient Education:      Cueing for breath timing and TA activation with each exercise

## 2024-07-03 ENCOUNTER — OFFICE VISIT (OUTPATIENT)
Dept: FAMILY MEDICINE CLINIC | Age: 64
End: 2024-07-03

## 2024-07-03 VITALS
DIASTOLIC BLOOD PRESSURE: 70 MMHG | SYSTOLIC BLOOD PRESSURE: 130 MMHG | HEART RATE: 87 BPM | OXYGEN SATURATION: 100 % | WEIGHT: 187 LBS | HEIGHT: 65 IN | BODY MASS INDEX: 31.16 KG/M2

## 2024-07-03 DIAGNOSIS — I10 BENIGN ESSENTIAL HTN: ICD-10-CM

## 2024-07-03 DIAGNOSIS — K76.0 FATTY LIVER: ICD-10-CM

## 2024-07-03 DIAGNOSIS — G47.33 OBSTRUCTIVE SLEEP APNEA SYNDROME: ICD-10-CM

## 2024-07-03 DIAGNOSIS — I10 BENIGN ESSENTIAL HTN: Primary | ICD-10-CM

## 2024-07-03 DIAGNOSIS — I83.893 VARICOSE VEINS OF BILATERAL LOWER EXTREMITIES WITH OTHER COMPLICATIONS: ICD-10-CM

## 2024-07-03 DIAGNOSIS — Z23 NEED FOR HEPATITIS A VACCINATION: ICD-10-CM

## 2024-07-03 DIAGNOSIS — M17.10 ARTHRITIS OF KNEE: ICD-10-CM

## 2024-07-03 DIAGNOSIS — E66.09 CLASS 1 OBESITY DUE TO EXCESS CALORIES WITHOUT SERIOUS COMORBIDITY WITH BODY MASS INDEX (BMI) OF 31.0 TO 31.9 IN ADULT: Chronic | ICD-10-CM

## 2024-07-03 LAB
ALBUMIN SERPL-MCNC: 4.5 G/DL (ref 3.4–5)
ALBUMIN/GLOB SERPL: 1.6 {RATIO} (ref 1.1–2.2)
ALP SERPL-CCNC: 123 U/L (ref 40–129)
ALT SERPL-CCNC: 19 U/L (ref 10–40)
ANION GAP SERPL CALCULATED.3IONS-SCNC: 13 MMOL/L (ref 3–16)
AST SERPL-CCNC: 16 U/L (ref 15–37)
BASOPHILS # BLD: 0.1 K/UL (ref 0–0.2)
BASOPHILS NFR BLD: 0.8 %
BILIRUB SERPL-MCNC: 0.6 MG/DL (ref 0–1)
BUN SERPL-MCNC: 16 MG/DL (ref 7–20)
CALCIUM SERPL-MCNC: 9.7 MG/DL (ref 8.3–10.6)
CHLORIDE SERPL-SCNC: 101 MMOL/L (ref 99–110)
CHOLEST SERPL-MCNC: 242 MG/DL (ref 0–199)
CO2 SERPL-SCNC: 28 MMOL/L (ref 21–32)
CREAT SERPL-MCNC: 0.7 MG/DL (ref 0.6–1.2)
DEPRECATED RDW RBC AUTO: 13.6 % (ref 12.4–15.4)
EOSINOPHIL # BLD: 0.3 K/UL (ref 0–0.6)
EOSINOPHIL NFR BLD: 2.9 %
GFR SERPLBLD CREATININE-BSD FMLA CKD-EPI: >90 ML/MIN/{1.73_M2}
GLUCOSE SERPL-MCNC: 65 MG/DL (ref 70–99)
HCT VFR BLD AUTO: 46.1 % (ref 36–48)
HDLC SERPL-MCNC: 69 MG/DL (ref 40–60)
HGB BLD-MCNC: 15.4 G/DL (ref 12–16)
LDLC SERPL CALC-MCNC: 140 MG/DL
LYMPHOCYTES # BLD: 1.9 K/UL (ref 1–5.1)
LYMPHOCYTES NFR BLD: 19.2 %
MCH RBC QN AUTO: 28.8 PG (ref 26–34)
MCHC RBC AUTO-ENTMCNC: 33.4 G/DL (ref 31–36)
MCV RBC AUTO: 86.1 FL (ref 80–100)
MONOCYTES # BLD: 0.6 K/UL (ref 0–1.3)
MONOCYTES NFR BLD: 5.7 %
NEUTROPHILS # BLD: 7 K/UL (ref 1.7–7.7)
NEUTROPHILS NFR BLD: 71.4 %
PLATELET # BLD AUTO: 259 K/UL (ref 135–450)
PMV BLD AUTO: 8.6 FL (ref 5–10.5)
POTASSIUM SERPL-SCNC: 4.4 MMOL/L (ref 3.5–5.1)
PROT SERPL-MCNC: 7.3 G/DL (ref 6.4–8.2)
RBC # BLD AUTO: 5.36 M/UL (ref 4–5.2)
SODIUM SERPL-SCNC: 142 MMOL/L (ref 136–145)
TRIGL SERPL-MCNC: 167 MG/DL (ref 0–150)
VLDLC SERPL CALC-MCNC: 33 MG/DL
WBC # BLD AUTO: 9.8 K/UL (ref 4–11)

## 2024-07-03 RX ORDER — SCOLOPAMINE TRANSDERMAL SYSTEM 1 MG/1
1 PATCH, EXTENDED RELEASE TRANSDERMAL
Qty: 4 PATCH | Refills: 0 | Status: SHIPPED | OUTPATIENT
Start: 2024-07-03

## 2024-07-03 SDOH — ECONOMIC STABILITY: HOUSING INSECURITY
IN THE LAST 12 MONTHS, WAS THERE A TIME WHEN YOU DID NOT HAVE A STEADY PLACE TO SLEEP OR SLEPT IN A SHELTER (INCLUDING NOW)?: NO

## 2024-07-03 SDOH — ECONOMIC STABILITY: INCOME INSECURITY: HOW HARD IS IT FOR YOU TO PAY FOR THE VERY BASICS LIKE FOOD, HOUSING, MEDICAL CARE, AND HEATING?: NOT HARD AT ALL

## 2024-07-03 SDOH — ECONOMIC STABILITY: FOOD INSECURITY: WITHIN THE PAST 12 MONTHS, THE FOOD YOU BOUGHT JUST DIDN'T LAST AND YOU DIDN'T HAVE MONEY TO GET MORE.: NEVER TRUE

## 2024-07-03 SDOH — ECONOMIC STABILITY: FOOD INSECURITY: WITHIN THE PAST 12 MONTHS, YOU WORRIED THAT YOUR FOOD WOULD RUN OUT BEFORE YOU GOT MONEY TO BUY MORE.: NEVER TRUE

## 2024-07-03 ASSESSMENT — PATIENT HEALTH QUESTIONNAIRE - PHQ9
SUM OF ALL RESPONSES TO PHQ QUESTIONS 1-9: 0
1. LITTLE INTEREST OR PLEASURE IN DOING THINGS: NOT AT ALL
SUM OF ALL RESPONSES TO PHQ QUESTIONS 1-9: 0
SUM OF ALL RESPONSES TO PHQ9 QUESTIONS 1 & 2: 0
2. FEELING DOWN, DEPRESSED OR HOPELESS: NOT AT ALL

## 2024-07-03 ASSESSMENT — ENCOUNTER SYMPTOMS
BLURRED VISION: 0
SHORTNESS OF BREATH: 0
ORTHOPNEA: 0

## 2024-07-03 NOTE — PROGRESS NOTES
Subjective:     Patient ID:Amina Sanon is a 64 y.o. female.    Hypertension  This is a chronic problem. The current episode started more than 1 year ago. The problem is unchanged. The problem is controlled. Pertinent negatives include no anxiety, blurred vision, chest pain, headaches, malaise/fatigue, neck pain, orthopnea, palpitations, peripheral edema, PND, shortness of breath or sweats. There are no associated agents to hypertension. Risk factors for coronary artery disease include obesity and post-menopausal state. Past treatments include angiotensin blockers and beta blockers. There are no compliance problems.  There is no history of angina, kidney disease, CAD/MI, CVA, heart failure, left ventricular hypertrophy, PVD or retinopathy. Identifiable causes of hypertension include sleep apnea. There is no history of chronic renal disease, coarctation of the aorta, hyperaldosteronism, hypercortisolism, hyperparathyroidism, a hypertension causing med, pheochromocytoma, renovascular disease or a thyroid problem.       Allergies   Allergen Reactions    Wound Dressing Adhesive Rash    Adhesive Tape     Demerol Hcl  [Meperidine]      Slow heartrate    Penicillins Hives       Current Outpatient Medications   Medication Sig Dispense Refill    scopolamine (TRANSDERM-SCOP) transdermal patch Place 1 patch onto the skin every 72 hours 4 patch 0    telmisartan (MICARDIS) 40 MG tablet TAKE 1 TABLET BY MOUTH DAILY 90 tablet 2    metoprolol tartrate (LOPRESSOR) 25 MG tablet TAKE 1 TABLET BY MOUTH TWICE DAILY 180 tablet 3    Cholecalciferol (VITAMIN D3) 50 MCG (2000 UT) CAPS Take by mouth      cetirizine (ZYRTEC) 5 MG tablet Take 1 tablet by mouth daily       No current facility-administered medications for this visit.       Past Medical History:   Diagnosis Date    Allergic rhinitis 5/26/2022    Benign essential HTN 2006    Bilateral plantar fasciitis 9/12/2016    COVID 1/28/2023    Obstructive sleep apnea syndrome 1/5/2023

## 2024-07-04 LAB
EST. AVERAGE GLUCOSE BLD GHB EST-MCNC: 99.7 MG/DL
HBA1C MFR BLD: 5.1 %

## 2024-07-05 RX ORDER — SEMAGLUTIDE 0.68 MG/ML
0.25 INJECTION, SOLUTION SUBCUTANEOUS WEEKLY
Qty: 3 ML | Refills: 1 | Status: SHIPPED | OUTPATIENT
Start: 2024-07-05

## 2024-08-07 RX ORDER — TELMISARTAN 40 MG/1
TABLET ORAL
Qty: 90 TABLET | Refills: 2 | Status: SHIPPED | OUTPATIENT
Start: 2024-08-07

## 2024-08-07 NOTE — TELEPHONE ENCOUNTER
Please Advise      Medication:   Requested Prescriptions     Pending Prescriptions Disp Refills    metoprolol tartrate (LOPRESSOR) 25 MG tablet [Pharmacy Med Name: METOPROLOL TARTRATE 25MG TABLETS] 180 tablet 3     Sig: TAKE 1 TABLET BY MOUTH TWICE DAILY    telmisartan (MICARDIS) 40 MG tablet [Pharmacy Med Name: TELMISARTAN 40MG TABLETS] 90 tablet 2     Sig: TAKE 1 TABLET BY MOUTH DAILY        Last Filled:  : 5/29/2024     Patient Phone Number: [phone] (home)     Last appt: 7/3/2024   Next appt: Visit date not found    Last OARRS:        No data to display

## 2024-09-05 NOTE — PROGRESS NOTES
[facility]  Oumou Alberto MD, FACS, [facility]  [phone]    OUTPATIENT CONSULTATION          Date of Consultation:  11/2/2023    PCP:  Manolo Bonilla MD       Chief Complaint: venous insufficiency    History of Present Illness:   We are asked to see this patient in consultation by Dr. Jennifer De La Cruz.    Amina Maciel a 64 y.o. female who presents today for chronic left leg swelling and pain.  She was recently seen by Dr. De La Cruz who sent her for a venous reflux study, images which I personally reviewed.  This was notable for no evidence of DVT or SVT and no evidence of clinically significant reflux of the deep or superficial vein system.  She does have a history of left knee arthroscopic procedures.  She also notes some pain localized to the left knee itself.  No recent injuries.  She now presents for further evaluation and management as indicated.       PastMedical History:  Past Medical History:   Diagnosis Date    Allergic rhinitis 5/26/2022    Benign essential HTN 2006    Bilateral plantar fasciitis 9/12/2016    COVID 1/28/2023    Obstructive sleep apnea syndrome 1/5/2023     Past Surgical History:   Past Surgical History:   Procedure Laterality Date    BREAST BIOPSY  12/21/2021    fibroadenoma    BREAST REDUCTION SURGERY  2012    CHOLECYSTECTOMY  1995    COLONOSCOPY  12/2017    hyperplastic polyp    ERCP  2008    ERCP  2013    KNEE CARTILAGE SURGERY Left 2012    UPPER GASTROINTESTINAL ENDOSCOPY  2005    retained stones--ERCP    US BREAST BIOPSY W LOC DEVICE 1ST LESION RIGHT Right 12/20/2021    US BREAST NEEDLE BIOPSY RIGHT 12/20/2021 Shelly Cueto MD [facility]TRASOUND     Home Medications:   Prior to Admission medications    Medication Sig Start Date End Date Taking? Authorizing Provider   Cholecalciferol (VITAMIN D3) 50 MCG (2000 UT) CAPS Take by mouth   Yes ProviderSamina MD   cetirizine (ZYRTEC) 5 MG tablet Take 1 tablet by mouth daily   Yes Samina Wood

## 2024-11-06 ENCOUNTER — OFFICE VISIT (OUTPATIENT)
Dept: FAMILY MEDICINE CLINIC | Age: 64
End: 2024-11-06
Payer: COMMERCIAL

## 2024-11-06 VITALS
DIASTOLIC BLOOD PRESSURE: 80 MMHG | HEIGHT: 65 IN | SYSTOLIC BLOOD PRESSURE: 124 MMHG | HEART RATE: 80 BPM | BODY MASS INDEX: 30.69 KG/M2 | OXYGEN SATURATION: 97 % | WEIGHT: 184.2 LBS

## 2024-11-06 DIAGNOSIS — E66.811 CLASS 1 OBESITY DUE TO EXCESS CALORIES WITHOUT SERIOUS COMORBIDITY WITH BODY MASS INDEX (BMI) OF 31.0 TO 31.9 IN ADULT: Primary | ICD-10-CM

## 2024-11-06 DIAGNOSIS — M17.10 ARTHRITIS OF KNEE: ICD-10-CM

## 2024-11-06 DIAGNOSIS — K75.81 NASH (NONALCOHOLIC STEATOHEPATITIS): ICD-10-CM

## 2024-11-06 DIAGNOSIS — I10 BENIGN ESSENTIAL HTN: Chronic | ICD-10-CM

## 2024-11-06 DIAGNOSIS — E66.09 CLASS 1 OBESITY DUE TO EXCESS CALORIES WITHOUT SERIOUS COMORBIDITY WITH BODY MASS INDEX (BMI) OF 31.0 TO 31.9 IN ADULT: Primary | ICD-10-CM

## 2024-11-06 PROCEDURE — G8484 FLU IMMUNIZE NO ADMIN: HCPCS | Performed by: FAMILY MEDICINE

## 2024-11-06 PROCEDURE — 99213 OFFICE O/P EST LOW 20 MIN: CPT | Performed by: FAMILY MEDICINE

## 2024-11-06 PROCEDURE — 1036F TOBACCO NON-USER: CPT | Performed by: FAMILY MEDICINE

## 2024-11-06 PROCEDURE — G8427 DOCREV CUR MEDS BY ELIG CLIN: HCPCS | Performed by: FAMILY MEDICINE

## 2024-11-06 PROCEDURE — 3079F DIAST BP 80-89 MM HG: CPT | Performed by: FAMILY MEDICINE

## 2024-11-06 PROCEDURE — 3074F SYST BP LT 130 MM HG: CPT | Performed by: FAMILY MEDICINE

## 2024-11-06 PROCEDURE — 3017F COLORECTAL CA SCREEN DOC REV: CPT | Performed by: FAMILY MEDICINE

## 2024-11-06 PROCEDURE — G8417 CALC BMI ABV UP PARAM F/U: HCPCS | Performed by: FAMILY MEDICINE

## 2024-11-06 ASSESSMENT — PATIENT HEALTH QUESTIONNAIRE - PHQ9
SUM OF ALL RESPONSES TO PHQ QUESTIONS 1-9: 0
1. LITTLE INTEREST OR PLEASURE IN DOING THINGS: NOT AT ALL
SUM OF ALL RESPONSES TO PHQ9 QUESTIONS 1 & 2: 0
SUM OF ALL RESPONSES TO PHQ QUESTIONS 1-9: 0
2. FEELING DOWN, DEPRESSED OR HOPELESS: NOT AT ALL

## 2024-11-06 ASSESSMENT — ENCOUNTER SYMPTOMS
COLOR CHANGE: 0
SHORTNESS OF BREATH: 0
PHOTOPHOBIA: 0
RHINORRHEA: 0
COUGH: 0
CHOKING: 0
SINUS PRESSURE: 0
EYE PAIN: 0
EYE REDNESS: 0
TROUBLE SWALLOWING: 0
WHEEZING: 0
VOICE CHANGE: 0
BACK PAIN: 0
APNEA: 0
BLOOD IN STOOL: 0
SORE THROAT: 0
NAUSEA: 0
ABDOMINAL PAIN: 0
FACIAL SWELLING: 0
CONSTIPATION: 0
STRIDOR: 0
DIARRHEA: 0
EYE DISCHARGE: 0
CHEST TIGHTNESS: 0
ALLERGIC/IMMUNOLOGIC NEGATIVE: 1
ANAL BLEEDING: 0
RECTAL PAIN: 0
ABDOMINAL DISTENTION: 0
EYE ITCHING: 0
VOMITING: 0

## 2024-11-06 NOTE — ASSESSMENT & PLAN NOTE
Chronic, not at goal (unstable), continue current treatment plan-refer to [facility] loss center

## 2024-11-06 NOTE — PROGRESS NOTES
Subjective:     Patient ID:Amina Sanon is a 64 y.o. female.    HPI  On semaglutide but not losing wgt-has severe knee arthritis,Htn,fatty liver(TAVERA)  Allergies   Allergen Reactions    Wound Dressing Adhesive Rash    Adhesive Tape     Demerol Hcl  [Meperidine]      Slow heartrate    Penicillins Hives       Current Outpatient Medications   Medication Sig Dispense Refill    Semaglutide, 1 MG/DOSE, (OZEMPIC) 4 MG/3ML SOPN sc injection Inject 1 mg into the skin every 7 days -please mix with B12 4 Adjustable Dose Pre-filled Pen Syringe 1    metoprolol tartrate (LOPRESSOR) 25 MG tablet TAKE 1 TABLET BY MOUTH TWICE DAILY 180 tablet 3    telmisartan (MICARDIS) 40 MG tablet TAKE 1 TABLET BY MOUTH DAILY 90 tablet 2    scopolamine (TRANSDERM-SCOP) transdermal patch Place 1 patch onto the skin every 72 hours 4 patch 0    Cholecalciferol (VITAMIN D3) 50 MCG (2000 UT) CAPS Take by mouth      cetirizine (ZYRTEC) 5 MG tablet Take 1 tablet by mouth daily       No current facility-administered medications for this visit.       Past Medical History:   Diagnosis Date    Allergic rhinitis 5/26/2022    Benign essential HTN 2006    Bilateral plantar fasciitis 9/12/2016    COVID 1/28/2023    Obstructive sleep apnea syndrome 1/5/2023       Past Surgical History:   Procedure Laterality Date    BREAST BIOPSY  12/21/2021    fibroadenoma    BREAST REDUCTION SURGERY  2012    CHOLECYSTECTOMY  1995    COLONOSCOPY  12/2017    hyperplastic polyp    ERCP  2008    ERCP  2013    KNEE CARTILAGE SURGERY Left 2012    UPPER GASTROINTESTINAL ENDOSCOPY  2005    retained stones--ERCP    US BREAST BIOPSY W LOC DEVICE 1ST LESION RIGHT Right 12/20/2021    US BREAST NEEDLE BIOPSY RIGHT 12/20/2021 Shelly Cueto MD [facility]TR[facility]       Social History     Socioeconomic History    Marital status:      Spouse name: Not on file    Number of children: 5    Years of education: Not on file    Highest education level: Not on file   Occupational History

## 2024-11-06 NOTE — ASSESSMENT & PLAN NOTE
Chronic, not at goal (unstable), continue current plan pending work up below--con't semaglutide--^ dose to 1 mg

## 2024-11-12 RX ORDER — BENZONATATE 200 MG/1
200 CAPSULE ORAL 3 TIMES DAILY PRN
Qty: 30 CAPSULE | Refills: 0 | Status: SHIPPED | OUTPATIENT
Start: 2024-11-12 | End: 2024-11-19

## 2024-11-12 RX ORDER — AZITHROMYCIN 250 MG/1
TABLET, FILM COATED ORAL
Qty: 6 TABLET | Refills: 0 | Status: SHIPPED | OUTPATIENT
Start: 2024-11-12 | End: 2024-11-22

## 2025-01-07 DIAGNOSIS — E66.811 CLASS 1 OBESITY DUE TO EXCESS CALORIES WITHOUT SERIOUS COMORBIDITY WITH BODY MASS INDEX (BMI) OF 31.0 TO 31.9 IN ADULT: ICD-10-CM

## 2025-01-07 DIAGNOSIS — M17.10 ARTHRITIS OF KNEE: Primary | ICD-10-CM

## 2025-01-07 DIAGNOSIS — I10 BENIGN ESSENTIAL HTN: ICD-10-CM

## 2025-01-07 DIAGNOSIS — K76.0 FATTY LIVER: ICD-10-CM

## 2025-01-07 DIAGNOSIS — E66.09 CLASS 1 OBESITY DUE TO EXCESS CALORIES WITHOUT SERIOUS COMORBIDITY WITH BODY MASS INDEX (BMI) OF 31.0 TO 31.9 IN ADULT: ICD-10-CM

## 2025-01-23 RX ORDER — BENZONATATE 200 MG/1
200 CAPSULE ORAL 3 TIMES DAILY PRN
Qty: 30 CAPSULE | Refills: 0 | Status: SHIPPED | OUTPATIENT
Start: 2025-01-23 | End: 2025-02-02

## 2025-02-28 ENCOUNTER — OFFICE VISIT (OUTPATIENT)
Dept: BARIATRICS/WEIGHT MGMT | Age: 65
End: 2025-02-28

## 2025-02-28 VITALS
DIASTOLIC BLOOD PRESSURE: 87 MMHG | HEIGHT: 65 IN | SYSTOLIC BLOOD PRESSURE: 139 MMHG | HEART RATE: 74 BPM | WEIGHT: 180 LBS | BODY MASS INDEX: 29.99 KG/M2

## 2025-02-28 DIAGNOSIS — E66.3 OVERWEIGHT (BMI 25.0-29.9): Primary | ICD-10-CM

## 2025-02-28 NOTE — PROGRESS NOTES
Amina Sanon is a 64 y.o. female with a date of birth of 1960.    Vitals:    02/28/25 1238   BP: 139/87   Pulse: 74   Weight: 81.6 kg (180 lb)   Height: 1.651 m (5' 5\")    BMI: Body mass index is 29.95 kg/m². Obesity Classification: overweight    Weight History:   Wt Readings from Last 3 Encounters:   02/28/25 81.6 kg (180 lb)   11/06/24 83.6 kg (184 lb 3.2 oz)   07/03/24 84.8 kg (187 lb)       Pt attended Medical Weight Management Seminar. Patient was educated on low-carb diet protocol. Nutrition and habit guidelines were discussed and written information was provided. Bariatric Nutrition Questionnaire completed during class and scanned into media.       Goals  Weight: 150 lbs.   Health Improvement: more energy, lower blood pressure, live longer     Assessment  Nutritional Needs:RMR=(9.99 x 81.6 kg) + (6.25 x 165 cm) - (4.92 x 64 y.o.) -161  = 1375 kcal x 1.3 (sedentary activity factor)= 1787 kcal - 500 (for 1 lb weight loss/week)= 1287 kcal.    Patient has participated in the following weight loss programs: [employer], diet workshop and [employer].   Patient has participated in meal replacement/liquid diets.  Patient has participated in weight loss medications.  Patient does not have history of bariatric surgery.     Plan  Plan/Recommendations: Start 1200 Kcal LCMP  Optifast:  Pt not interested in   Diet Medications:  Pt interested in   1:1 RD Visit:  Pt interested in     PES Statement: Overweight/Obesity related to lack of exercise, sedentary lifestyle, unhealthy eating habits, and unsuccessful diet attempts as evidenced by BMI. Body mass index is 29.95 kg/m².    Handouts: New Pt. Pkt.     Will follow up as necessary.    Gail Antonio, RD, LD

## 2025-03-06 ENCOUNTER — TELEMEDICINE (OUTPATIENT)
Dept: BARIATRICS/WEIGHT MGMT | Age: 65
End: 2025-03-06
Payer: COMMERCIAL

## 2025-03-06 DIAGNOSIS — E66.811 CLASS 1 OBESITY: Primary | ICD-10-CM

## 2025-03-06 DIAGNOSIS — Z71.3 DIETARY COUNSELING AND SURVEILLANCE: ICD-10-CM

## 2025-03-06 PROCEDURE — G2211 COMPLEX E/M VISIT ADD ON: HCPCS | Performed by: FAMILY MEDICINE

## 2025-03-06 PROCEDURE — 3017F COLORECTAL CA SCREEN DOC REV: CPT | Performed by: FAMILY MEDICINE

## 2025-03-06 PROCEDURE — G8427 DOCREV CUR MEDS BY ELIG CLIN: HCPCS | Performed by: FAMILY MEDICINE

## 2025-03-06 PROCEDURE — 99204 OFFICE O/P NEW MOD 45 MIN: CPT | Performed by: FAMILY MEDICINE

## 2025-03-06 ASSESSMENT — ENCOUNTER SYMPTOMS
PHOTOPHOBIA: 0
DIARRHEA: 0
COUGH: 0
EYE PAIN: 0
APNEA: 0
CHEST TIGHTNESS: 0
CONSTIPATION: 0
BLOOD IN STOOL: 0
ABDOMINAL PAIN: 0
ABDOMINAL DISTENTION: 0
SHORTNESS OF BREATH: 0
WHEEZING: 0
VOMITING: 0
CHOKING: 0
NAUSEA: 0

## 2025-03-06 NOTE — PROGRESS NOTES
Patient: Amina Sanon     Encounter Date: 3/6/2025    YOB: 1960               Age: 64 y.o.        Patient identification was verified at the start of the visit.         5/26/2022    10:13 AM   Patient-Reported Vitals   Patient-Reported Weight 180 lb   Patient-Reported Height 5.4'         BP Readings from Last 1 Encounters:   02/28/25 139/87       BMI Readings from Last 1 Encounters:   02/28/25 29.95 kg/m²       Pulse Readings from Last 1 Encounters:   02/28/25 74                                             Wt Readings from Last 3 Encounters:   02/28/25 81.6 kg (180 lb)   11/06/24 83.6 kg (184 lb 3.2 oz)   07/03/24 84.8 kg (187 lb)        Chief Complaint   Patient presents with    Bariatric, Initial Visit     [facility]- NP        HPI:    64 y.o. female presents to establish care via video visit. She was referred by Dr. Manolo Bonilla for weight management. The patient's medical history is significant for class obesity. The patient has a long-standing history of obesity which started gradually. The problem is mild.  The patient has been gaining weight.  Risk factors include annual weight gain of >2 lbs (1 kg)/ year and sedentary lifestyle. Aggravating factors include poor diet and lack of physical activity. The patient has tried various diet/exercise plans which have been ineffective in the long-run. she is motivated to start losing weight to help improve her overall health.     On compounded semaglutide but hasn't lost much weight    Starting weight: 187 pounds (September 2024)    When did you become overweight?  [] Childhood   [] Teens   [x] Adulthood   [] Pregnancy   [] Menopause    Highest adult weight: 187 pounds at age 64    Triggers for weight gain?   [] Stress   [] Illness   [] Medications   [] Travel  []Injury     [] Nightshift work   [] Insomnia  [x] No specific triggers   [] Other    Food triggers:   [x] Stress   [x] Boredom   [] Fast food   [] Eating out   [] Seeking reward   [] Social

## 2025-03-07 DIAGNOSIS — E66.811 CLASS 1 OBESITY: ICD-10-CM

## 2025-03-07 LAB
25(OH)D3 SERPL-MCNC: 35 NG/ML
ALBUMIN SERPL-MCNC: 4.6 G/DL (ref 3.4–5)
ALBUMIN/GLOB SERPL: 1.7 {RATIO} (ref 1.1–2.2)
ALP SERPL-CCNC: 161 U/L (ref 40–129)
ALT SERPL-CCNC: 79 U/L (ref 10–40)
ANION GAP SERPL CALCULATED.3IONS-SCNC: 11 MMOL/L (ref 3–16)
AST SERPL-CCNC: 58 U/L (ref 15–37)
BILIRUB SERPL-MCNC: 0.7 MG/DL (ref 0–1)
BUN SERPL-MCNC: 16 MG/DL (ref 7–20)
CALCIUM SERPL-MCNC: 9.6 MG/DL (ref 8.3–10.6)
CHLORIDE SERPL-SCNC: 102 MMOL/L (ref 99–110)
CHOLEST SERPL-MCNC: 228 MG/DL (ref 0–199)
CO2 SERPL-SCNC: 27 MMOL/L (ref 21–32)
CREAT SERPL-MCNC: 0.8 MG/DL (ref 0.6–1.2)
DEPRECATED RDW RBC AUTO: 13.3 % (ref 12.4–15.4)
EST. AVERAGE GLUCOSE BLD GHB EST-MCNC: 91.1 MG/DL
FOLATE SERPL-MCNC: 7.89 NG/ML (ref 4.78–24.2)
GFR SERPLBLD CREATININE-BSD FMLA CKD-EPI: 82 ML/MIN/{1.73_M2}
GLUCOSE SERPL-MCNC: 69 MG/DL (ref 70–99)
HBA1C MFR BLD: 4.8 %
HCT VFR BLD AUTO: 46.8 % (ref 36–48)
HDLC SERPL-MCNC: 54 MG/DL (ref 40–60)
HGB BLD-MCNC: 15.9 G/DL (ref 12–16)
LDLC SERPL CALC-MCNC: 147 MG/DL
MCH RBC QN AUTO: 29.3 PG (ref 26–34)
MCHC RBC AUTO-ENTMCNC: 34 G/DL (ref 31–36)
MCV RBC AUTO: 86.1 FL (ref 80–100)
PLATELET # BLD AUTO: 227 K/UL (ref 135–450)
PMV BLD AUTO: 9.3 FL (ref 5–10.5)
POTASSIUM SERPL-SCNC: 4.6 MMOL/L (ref 3.5–5.1)
PROT SERPL-MCNC: 7.3 G/DL (ref 6.4–8.2)
RBC # BLD AUTO: 5.44 M/UL (ref 4–5.2)
SODIUM SERPL-SCNC: 140 MMOL/L (ref 136–145)
TRIGL SERPL-MCNC: 134 MG/DL (ref 0–150)
TSH SERPL DL<=0.005 MIU/L-ACNC: 0.98 UIU/ML (ref 0.27–4.2)
VIT B12 SERPL-MCNC: 1520 PG/ML (ref 211–911)
VLDLC SERPL CALC-MCNC: 27 MG/DL
WBC # BLD AUTO: 8.2 K/UL (ref 4–11)

## 2025-03-14 ENCOUNTER — TELEMEDICINE (OUTPATIENT)
Dept: BARIATRICS/WEIGHT MGMT | Age: 65
End: 2025-03-14
Payer: COMMERCIAL

## 2025-03-14 DIAGNOSIS — E66.811 CLASS 1 OBESITY: Primary | ICD-10-CM

## 2025-03-14 DIAGNOSIS — R74.8 ELEVATED LIVER ENZYMES: ICD-10-CM

## 2025-03-14 DIAGNOSIS — E78.5 HYPERLIPIDEMIA, UNSPECIFIED HYPERLIPIDEMIA TYPE: ICD-10-CM

## 2025-03-14 DIAGNOSIS — Z71.3 DIETARY COUNSELING AND SURVEILLANCE: ICD-10-CM

## 2025-03-14 PROCEDURE — G2211 COMPLEX E/M VISIT ADD ON: HCPCS | Performed by: FAMILY MEDICINE

## 2025-03-14 PROCEDURE — 99214 OFFICE O/P EST MOD 30 MIN: CPT | Performed by: FAMILY MEDICINE

## 2025-03-14 PROCEDURE — 3017F COLORECTAL CA SCREEN DOC REV: CPT | Performed by: FAMILY MEDICINE

## 2025-03-14 PROCEDURE — G8427 DOCREV CUR MEDS BY ELIG CLIN: HCPCS | Performed by: FAMILY MEDICINE

## 2025-03-14 NOTE — PROGRESS NOTES
normal.         Behavior: Behavior normal.         Thought Content: Thought content normal.         Judgment: Judgment normal.         Orders Only on 03/07/2025   Component Date Value Ref Range Status    WBC 03/07/2025 8.2  4.0 - 11.0 K/uL Final    RBC 03/07/2025 5.44 (H)  4.00 - 5.20 M/uL Final    Hemoglobin 03/07/2025 15.9  12.0 - 16.0 g/dL Final    Hematocrit 03/07/2025 46.8  36.0 - 48.0 % Final    MCV 03/07/2025 86.1  80.0 - 100.0 fL Final    MCH 03/07/2025 29.3  26.0 - 34.0 pg Final    MCHC 03/07/2025 34.0  31.0 - 36.0 g/dL Final    RDW 03/07/2025 13.3  12.4 - 15.4 % Final    Platelets 03/07/2025 227  135 - 450 K/uL Final    MPV 03/07/2025 9.3  5.0 - 10.5 fL Final    Vit D, 25-Hydroxy 03/07/2025 35.0  >=30 ng/mL Final    Comment: <=20 ng/mL.............Deficient  21-29 ng/mL...........Insufficient  >=30 ng/mL..........Sufficient      Vitamin B-12 03/07/2025 1520 (H)  211 - 911 pg/mL Final    Folate 03/07/2025 7.89  4.78 - 24.20 ng/mL Final    Comment: Specimen hemolysis has exceeded the interference as defined by [employer].  Result may be affected. Suggest recollection if clinically indicated.  Effective 11-15-16 10:00am EST  Please note reference ranges have  changed for Folate.      TSH Reflex FT4 03/07/2025 0.98  0.27 - 4.20 uIU/mL Final    Cholesterol, Total 03/07/2025 228 (H)  0 - 199 mg/dL Final    Triglycerides 03/07/2025 134  0 - 150 mg/dL Final    HDL 03/07/2025 54  40 - 60 mg/dL Final    Comment: An HDL cholesterol less than 40 mg/dL is low and  constitutes a coronary heart disease risk factor.  An HDL cholesterol greater than 60 mg/dL is a  negative risk factor for coronary heart disease.      LDL Cholesterol 03/07/2025 147 (H)  <100 mg/dL Final    VLDL Cholesterol Calculated 03/07/2025 27  Not Established mg/dL Final    Hemoglobin A1C 03/07/2025 4.8  See comment % Final    Comment: Comment:  Diagnosis of Diabetes: > or = 6.5%  Increased risk of diabetes (Prediabetes): 5.7-6.4%  Glycemic Control:

## 2025-03-18 RX ORDER — TIRZEPATIDE 2.5 MG/.5ML
2.5 INJECTION, SOLUTION SUBCUTANEOUS WEEKLY
Qty: 2 ML | Refills: 0 | Status: SHIPPED | OUTPATIENT
Start: 2025-03-18

## 2025-04-07 DIAGNOSIS — E66.811 CLASS 1 OBESITY: ICD-10-CM

## 2025-04-07 LAB
ALBUMIN SERPL-MCNC: 4.4 G/DL (ref 3.4–5)
ALP SERPL-CCNC: 133 U/L (ref 40–129)
ALT SERPL-CCNC: 30 U/L (ref 10–40)
AST SERPL-CCNC: 22 U/L (ref 15–37)
BILIRUB DIRECT SERPL-MCNC: 0.3 MG/DL (ref 0–0.3)
BILIRUB INDIRECT SERPL-MCNC: 0.4 MG/DL (ref 0–1)
BILIRUB SERPL-MCNC: 0.7 MG/DL (ref 0–1)
PROT SERPL-MCNC: 6.9 G/DL (ref 6.4–8.2)

## 2025-04-09 ENCOUNTER — TELEMEDICINE (OUTPATIENT)
Dept: BARIATRICS/WEIGHT MGMT | Age: 65
End: 2025-04-09
Payer: COMMERCIAL

## 2025-04-09 DIAGNOSIS — E66.811 CLASS 1 OBESITY: Primary | ICD-10-CM

## 2025-04-09 DIAGNOSIS — Z71.3 DIETARY COUNSELING AND SURVEILLANCE: ICD-10-CM

## 2025-04-09 PROCEDURE — G2211 COMPLEX E/M VISIT ADD ON: HCPCS | Performed by: FAMILY MEDICINE

## 2025-04-09 PROCEDURE — 99214 OFFICE O/P EST MOD 30 MIN: CPT | Performed by: FAMILY MEDICINE

## 2025-04-09 PROCEDURE — G8427 DOCREV CUR MEDS BY ELIG CLIN: HCPCS | Performed by: FAMILY MEDICINE

## 2025-04-09 PROCEDURE — 3017F COLORECTAL CA SCREEN DOC REV: CPT | Performed by: FAMILY MEDICINE

## 2025-04-09 ASSESSMENT — ENCOUNTER SYMPTOMS
WHEEZING: 0
CHOKING: 0
PHOTOPHOBIA: 0
SHORTNESS OF BREATH: 0
VOMITING: 0
ABDOMINAL PAIN: 0
ABDOMINAL DISTENTION: 0
DIARRHEA: 0
COUGH: 0
NAUSEA: 0
CHEST TIGHTNESS: 0
APNEA: 0
CONSTIPATION: 0
BLOOD IN STOOL: 0
EYE PAIN: 0

## 2025-04-09 NOTE — PROGRESS NOTES
Patient: Amina Sanon                      Encounter Date: 4/9/2025    YOB: 1960                Age: 64 y.o.    Chief Complaint   Patient presents with    Weight Management     F/u MWM          Patient identification was verified at the start of the visit.         4/9/2025     3:32 PM   Patient-Reported Vitals   Patient-Reported Weight 179   Patient-Reported Height 5’4”         BP Readings from Last 1 Encounters:   02/28/25 139/87       BMI Readings from Last 1 Encounters:   04/10/25 30.73 kg/m²       Pulse Readings from Last 1 Encounters:   02/28/25 74          Wt Readings from Last 3 Encounters:   04/10/25 81.2 kg (179 lb)   02/28/25 81.6 kg (180 lb)   11/06/24 83.6 kg (184 lb 3.2 oz)        HPI: 64 y.o. female with a long-standing history of obesity presents today for virtual video follow-up. Her weight is stable from her last visit. Current treatment includes Zepbound 2.5 mg SC weekly. Generally follow low carb/jessica. No issues.     Medication(s): Appetite well controlled?     [x]Yes      []No    Focus:     []Good     [x]Fair     []Poor    Side effects? No         Any recent change in medication(s)? No     Will have eye surgery 5/21 (will be off tx starting 5/9, will resume ~5/23)    Allergies   Allergen Reactions    Wound Dressing Adhesive Rash    Adhesive Tape     Demerol Hcl  [Meperidine]      Slow heartrate    Penicillins Hives         Current Outpatient Medications:     tirzepatide-weight management (ZEPBOUND) 5 MG/0.5ML SOLN subCUTAneous injection (VIAL), Inject 0.5 mLs into the skin every 7 days, Disp: 2 mL, Rfl: 0    metoprolol tartrate (LOPRESSOR) 25 MG tablet, TAKE 1 TABLET BY MOUTH TWICE DAILY, Disp: 180 tablet, Rfl: 3    telmisartan (MICARDIS) 40 MG tablet, TAKE 1 TABLET BY MOUTH DAILY, Disp: 90 tablet, Rfl: 2    Cholecalciferol (VITAMIN D3) 50 MCG (2000 UT) CAPS, Take by mouth, Disp: , Rfl:     cetirizine (ZYRTEC) 5 MG tablet, Take 1 tablet by mouth daily, Disp: , Rfl:     Patient

## 2025-04-10 ENCOUNTER — HOSPITAL ENCOUNTER (OUTPATIENT)
Dept: MAMMOGRAPHY | Age: 65
Discharge: HOME | End: 2025-04-10
Payer: COMMERCIAL

## 2025-04-10 VITALS — BODY MASS INDEX: 30.56 KG/M2 | HEIGHT: 64 IN | WEIGHT: 179 LBS

## 2025-04-10 DIAGNOSIS — Z12.31 VISIT FOR SCREENING MAMMOGRAM: ICD-10-CM

## 2025-04-10 PROCEDURE — 77063 BREAST TOMOSYNTHESIS BI: CPT

## 2025-04-11 RX ORDER — TIRZEPATIDE 5 MG/.5ML
5 INJECTION, SOLUTION SUBCUTANEOUS
Qty: 2 ML | Refills: 0 | Status: SHIPPED | OUTPATIENT
Start: 2025-04-11

## 2025-05-07 ENCOUNTER — TELEMEDICINE (OUTPATIENT)
Dept: BARIATRICS/WEIGHT MGMT | Age: 65
End: 2025-05-07
Payer: COMMERCIAL

## 2025-05-07 DIAGNOSIS — E66.811 CLASS 1 OBESITY: Primary | ICD-10-CM

## 2025-05-07 DIAGNOSIS — Z71.3 DIETARY COUNSELING AND SURVEILLANCE: ICD-10-CM

## 2025-05-07 PROCEDURE — G8427 DOCREV CUR MEDS BY ELIG CLIN: HCPCS | Performed by: FAMILY MEDICINE

## 2025-05-07 PROCEDURE — 99214 OFFICE O/P EST MOD 30 MIN: CPT | Performed by: FAMILY MEDICINE

## 2025-05-07 PROCEDURE — 3017F COLORECTAL CA SCREEN DOC REV: CPT | Performed by: FAMILY MEDICINE

## 2025-05-07 NOTE — PROGRESS NOTES
Patient: Amina Sanon                      Encounter Date: 5/7/2025    YOB: 1960                Age: 64 y.o.    Chief Complaint   Patient presents with    Weight Management     F/u MWM          Patient identification was verified at the start of the visit.         4/9/2025     3:32 PM   Patient-Reported Vitals   Patient-Reported Weight 179   Patient-Reported Height 5’4”         BP Readings from Last 1 Encounters:   02/28/25 139/87       BMI Readings from Last 1 Encounters:   04/10/25 30.73 kg/m²       Pulse Readings from Last 1 Encounters:   02/28/25 74          Wt Readings from Last 3 Encounters:   04/10/25 81.2 kg (179 lb)   02/28/25 81.6 kg (180 lb)   11/06/24 83.6 kg (184 lb 3.2 oz)      Self-reported weight: 177.5 pounds     HPI: 64 y.o. female with a long-standing history of obesity presents today for virtual video follow-up. She has lost 1.5 pounds from her last visit. Current treatment includes Zepbound 5 mg SC weekly. Trying to be more mindful or portion sizes. Still dining out frequently. Staying physically active--pilates and [employer] program.     Food recall:   B: Bowl of cereal  L: Chicken sandwich from [employer]  D: Dinning out tonight-- unsure       Medication(s): Appetite well controlled?     [x]Yes      []No                          Focus:     []Good     [x]Fair     []Poor                          Side effects? No         Any recent change in medication(s)? No      Will have eye surgery 5/21 (will be off tx starting 5/9, will resume ~5/23)     Allergies   Allergen Reactions    Wound Dressing Adhesive Rash    Adhesive Tape     Demerol Hcl  [Meperidine]      Slow heartrate    Penicillins Hives         Current Outpatient Medications:     tirzepatide-weight management (ZEPBOUND) 5 MG/0.5ML SOLN subCUTAneous injection (VIAL), Inject 0.5 mLs into the skin every 7 days, Disp: 2 mL, Rfl: 0    metoprolol tartrate (LOPRESSOR) 25 MG tablet, TAKE 1 TABLET BY MOUTH TWICE DAILY, Disp: 180

## 2025-05-09 ENCOUNTER — OFFICE VISIT (OUTPATIENT)
Dept: FAMILY MEDICINE CLINIC | Age: 65
End: 2025-05-09
Payer: COMMERCIAL

## 2025-05-09 VITALS
SYSTOLIC BLOOD PRESSURE: 124 MMHG | OXYGEN SATURATION: 97 % | WEIGHT: 178.6 LBS | BODY MASS INDEX: 30.49 KG/M2 | HEIGHT: 64 IN | DIASTOLIC BLOOD PRESSURE: 80 MMHG | HEART RATE: 71 BPM

## 2025-05-09 DIAGNOSIS — H02.403 ACQUIRED PTOSIS OF BOTH EYELIDS: ICD-10-CM

## 2025-05-09 DIAGNOSIS — Z01.818 PRE-OP EXAM: ICD-10-CM

## 2025-05-09 DIAGNOSIS — I10 BENIGN ESSENTIAL HTN: Primary | Chronic | ICD-10-CM

## 2025-05-09 PROCEDURE — G8417 CALC BMI ABV UP PARAM F/U: HCPCS | Performed by: FAMILY MEDICINE

## 2025-05-09 PROCEDURE — 1036F TOBACCO NON-USER: CPT | Performed by: FAMILY MEDICINE

## 2025-05-09 PROCEDURE — G8427 DOCREV CUR MEDS BY ELIG CLIN: HCPCS | Performed by: FAMILY MEDICINE

## 2025-05-09 PROCEDURE — 99214 OFFICE O/P EST MOD 30 MIN: CPT | Performed by: FAMILY MEDICINE

## 2025-05-09 PROCEDURE — 3079F DIAST BP 80-89 MM HG: CPT | Performed by: FAMILY MEDICINE

## 2025-05-09 PROCEDURE — 3074F SYST BP LT 130 MM HG: CPT | Performed by: FAMILY MEDICINE

## 2025-05-09 PROCEDURE — 3017F COLORECTAL CA SCREEN DOC REV: CPT | Performed by: FAMILY MEDICINE

## 2025-05-09 SDOH — ECONOMIC STABILITY: FOOD INSECURITY: WITHIN THE PAST 12 MONTHS, THE FOOD YOU BOUGHT JUST DIDN'T LAST AND YOU DIDN'T HAVE MONEY TO GET MORE.: NEVER TRUE

## 2025-05-09 SDOH — ECONOMIC STABILITY: FOOD INSECURITY: WITHIN THE PAST 12 MONTHS, YOU WORRIED THAT YOUR FOOD WOULD RUN OUT BEFORE YOU GOT MONEY TO BUY MORE.: NEVER TRUE

## 2025-05-09 ASSESSMENT — PATIENT HEALTH QUESTIONNAIRE - PHQ9
SUM OF ALL RESPONSES TO PHQ QUESTIONS 1-9: 0
SUM OF ALL RESPONSES TO PHQ QUESTIONS 1-9: 0
2. FEELING DOWN, DEPRESSED OR HOPELESS: NOT AT ALL
1. LITTLE INTEREST OR PLEASURE IN DOING THINGS: NOT AT ALL
SUM OF ALL RESPONSES TO PHQ QUESTIONS 1-9: 0
SUM OF ALL RESPONSES TO PHQ QUESTIONS 1-9: 0

## 2025-05-09 NOTE — PROGRESS NOTES
Subjective:      Amina Sanon is a 64 y.o. female who presents to the office today for a preoperative consultation at the request of surgeon Dr Haynes who plans on performing brow lift(poor vision) on May 21.  This consultation is requested for the specific conditions prompting preoperative evaluation (i.e. because of potential affect on operative risk): Htn.  Planned anesthesia is Local and IV sedation.  The patient and family have no known anesthesia issues nor bleeding risks.   Past Medical History:   Diagnosis Date    Allergic rhinitis 05/26/2022    Arthritis     Benign essential HTN 2006    Bilateral plantar fasciitis 09/12/2016    COVID 01/28/2023    Fatty liver     Obstructive sleep apnea syndrome 01/05/2023    not using cpap     Patient Active Problem List    Diagnosis Date Noted    Primary osteoarthritis of both hands 01/10/2023    Obstructive sleep apnea syndrome 01/05/2023    Acrophobia 06/08/2022    Allergic rhinitis 05/26/2022    Class 1 obesity due to excess calories without serious comorbidity with body mass index (BMI) of 31.0 to 31.9 in adult 05/26/2022    Acquired ptosis of both eyelids 05/09/2025    Pre-op exam 05/09/2025    Arthritis of knee 07/03/2024    TAVERA (nonalcoholic steatohepatitis) 10/27/2023    PTSD (post-traumatic stress disorder) 08/23/2021    Neck pain 07/29/2021    Fatty liver 08/16/2017    Bilateral plantar fasciitis 09/12/2016    Benign essential HTN 09/12/2016    Neoplasm of uncertain behavior of skin of eyelid 11/03/2015    Hypercholesteremia 08/19/2015    Swelling of limb 04/10/2012    Chondromalacia of patella 04/05/2012    Internal derangement of knee 04/05/2012    Tear of lateral cartilage or meniscus of knee, current 04/05/2012    Rosacea 02/07/2011    Personal history of endocrine, metabolic, and immunity disorders 10/22/2008    Nonspecific abnormal results of liver function study 09/29/2008    Varicose veins of bilateral lower extremities with other complications

## 2025-05-28 RX ORDER — TELMISARTAN 40 MG/1
40 TABLET ORAL DAILY
Qty: 90 TABLET | Refills: 2 | Status: SHIPPED | OUTPATIENT
Start: 2025-05-28

## 2025-06-08 PROBLEM — Z01.818 PRE-OP EXAM: Status: RESOLVED | Noted: 2025-05-09 | Resolved: 2025-06-08

## 2025-06-13 DIAGNOSIS — E66.811 CLASS 1 OBESITY: Primary | ICD-10-CM

## 2025-06-13 NOTE — TELEPHONE ENCOUNTER
Pt requesting refill sent prior to follow-up  appt on 6/17. Patient wants to make sure she receives her medication delivery prior to leaving on vacation.

## 2025-06-13 NOTE — TELEPHONE ENCOUNTER
Pt requesting refill sent prior to follow-up appt on 6/17. Patient wants to make sure she receives her medication delivery prior to leaving on vacation.     Pt ok to stay on current dose to ensure she does not miss a dose.

## 2025-06-17 ENCOUNTER — TELEMEDICINE (OUTPATIENT)
Dept: BARIATRICS/WEIGHT MGMT | Age: 65
End: 2025-06-17
Payer: COMMERCIAL

## 2025-06-17 DIAGNOSIS — Z71.3 DIETARY COUNSELING AND SURVEILLANCE: ICD-10-CM

## 2025-06-17 DIAGNOSIS — E66.811 CLASS 1 OBESITY: Primary | ICD-10-CM

## 2025-06-17 PROCEDURE — 3017F COLORECTAL CA SCREEN DOC REV: CPT | Performed by: FAMILY MEDICINE

## 2025-06-17 PROCEDURE — G8427 DOCREV CUR MEDS BY ELIG CLIN: HCPCS | Performed by: FAMILY MEDICINE

## 2025-06-17 PROCEDURE — 99214 OFFICE O/P EST MOD 30 MIN: CPT | Performed by: FAMILY MEDICINE

## 2025-06-17 ASSESSMENT — ENCOUNTER SYMPTOMS
ABDOMINAL DISTENTION: 0
WHEEZING: 0
CONSTIPATION: 0
ABDOMINAL PAIN: 0
CHEST TIGHTNESS: 0
DIARRHEA: 0
SHORTNESS OF BREATH: 0
CHOKING: 0
APNEA: 0
COUGH: 0
BLOOD IN STOOL: 0
VOMITING: 0
EYE PAIN: 0
NAUSEA: 0
PHOTOPHOBIA: 0

## 2025-06-17 NOTE — PROGRESS NOTES
Patient: Amina Sanon                      Encounter Date: 6/17/2025    YOB: 1960                Age: 64 y.o.    Chief Complaint   Patient presents with    Weight Management     F/u MWM          Patient identification was verified at the start of the visit.         4/9/2025     3:32 PM   Patient-Reported Vitals   Patient-Reported Weight 179   Patient-Reported Height 5’4”         BP Readings from Last 1 Encounters:   05/09/25 124/80       BMI Readings from Last 1 Encounters:   05/09/25 30.66 kg/m²       Pulse Readings from Last 1 Encounters:   05/09/25 71          Wt Readings from Last 3 Encounters:   05/09/25 81 kg (178 lb 9.6 oz)   04/10/25 81.2 kg (179 lb)   02/28/25 81.6 kg (180 lb)        Self-reported weight: 175 pounds      HPI: 64 y.o. female with a long-standing history of obesity presents today for virtual video follow-up. She has lost 2.5 pounds from her last visit. Current treatment includes Zepbound 7.5 mg SC weekly.  Mindful of food choices.  Going on vacation to [place] next week.  Plans on staying physically active.        Medication(s): Appetite well controlled?     [x]Yes      []No                          Focus:     []Good     [x]Fair     []Poor                          Side effects? No         Any recent change in medication(s)? No        Allergies   Allergen Reactions    Wound Dressing Adhesive Rash    Adhesive Tape     Demerol Hcl  [Meperidine]      Slow heartrate    Penicillins Hives         Current Outpatient Medications:     Tirzepatide-Weight Management (ZEPBOUND) 7.5 MG/0.5ML SOLN subCUTAneous injection (VIAL), Inject 0.5 mLs into the skin every 7 days, Disp: 2 mL, Rfl: 0    telmisartan (MICARDIS) 40 MG tablet, TAKE 1 TABLET BY MOUTH DAILY, Disp: 90 tablet, Rfl: 2    METAMUCIL FIBER PO, Take by mouth, Disp: , Rfl:     Probiotic Product (ALIGN PO), Take by mouth, Disp: , Rfl:     metoprolol tartrate (LOPRESSOR) 25 MG tablet, TAKE 1 TABLET BY MOUTH TWICE DAILY, Disp: 180

## 2025-07-10 ENCOUNTER — TELEPHONE (OUTPATIENT)
Dept: BARIATRICS/WEIGHT MGMT | Age: 65
End: 2025-07-10

## 2025-07-10 ENCOUNTER — TELEMEDICINE (OUTPATIENT)
Dept: BARIATRICS/WEIGHT MGMT | Age: 65
End: 2025-07-10
Payer: COMMERCIAL

## 2025-07-10 DIAGNOSIS — Z71.3 DIETARY COUNSELING AND SURVEILLANCE: ICD-10-CM

## 2025-07-10 DIAGNOSIS — E66.811 CLASS 1 OBESITY: Primary | ICD-10-CM

## 2025-07-10 PROCEDURE — 3017F COLORECTAL CA SCREEN DOC REV: CPT | Performed by: FAMILY MEDICINE

## 2025-07-10 PROCEDURE — 99214 OFFICE O/P EST MOD 30 MIN: CPT | Performed by: FAMILY MEDICINE

## 2025-07-10 PROCEDURE — G8427 DOCREV CUR MEDS BY ELIG CLIN: HCPCS | Performed by: FAMILY MEDICINE

## 2025-07-10 NOTE — PROGRESS NOTES
Patient: Amina Sanon                      Encounter Date: 7/10/2025    YOB: 1960                Age: 65 y.o.    Chief Complaint   Patient presents with    Weight Management     F/u MWM          Patient identification was verified at the start of the visit.         4/9/2025     3:32 PM   Patient-Reported Vitals   Patient-Reported Weight 179   Patient-Reported Height 5’4”         BP Readings from Last 1 Encounters:   05/09/25 124/80       BMI Readings from Last 1 Encounters:   05/09/25 30.66 kg/m²       Pulse Readings from Last 1 Encounters:   05/09/25 71          Wt Readings from Last 3 Encounters:   05/09/25 81 kg (178 lb 9.6 oz)   04/10/25 81.2 kg (179 lb)   02/28/25 81.6 kg (180 lb)        Self-reported weight: 175 pounds (7/10)     HPI: 65 y.o. female with a long-standing history of obesity presents today for virtual video follow-up.  Her weight is stable from her last visit.  Current treatment includes Zepbound 7.5 mg SC weekly.  No significant changes in diet or physical activity.        Medication(s): Appetite well controlled?     []Yes      [x]No                          Focus:     []Good     [x]Fair     []Poor                          Side effects? No         Any recent change in medication(s)? No    Allergies   Allergen Reactions    Wound Dressing Adhesive Rash    Adhesive Tape     Demerol Hcl  [Meperidine]      Slow heartrate    Penicillins Hives         Current Outpatient Medications:     tirzepatide-weight management (ZEPBOUND) 10 MG/0.5ML SOAJ subCUTAneous auto-injector pen, Inject 10 mg into the skin every 7 days, Disp: 2 mL, Rfl: 0    telmisartan (MICARDIS) 40 MG tablet, TAKE 1 TABLET BY MOUTH DAILY, Disp: 90 tablet, Rfl: 2    METAMUCIL FIBER PO, Take by mouth, Disp: , Rfl:     Probiotic Product (ALIGN PO), Take by mouth, Disp: , Rfl:     metoprolol tartrate (LOPRESSOR) 25 MG tablet, TAKE 1 TABLET BY MOUTH TWICE DAILY, Disp: 180 tablet, Rfl: 3    Cholecalciferol (VITAMIN D3) 50 MCG

## 2025-07-14 ENCOUNTER — TELEPHONE (OUTPATIENT)
Dept: BARIATRICS/WEIGHT MGMT | Age: 65
End: 2025-07-14

## 2025-07-14 ENCOUNTER — CLINICAL DOCUMENTATION (OUTPATIENT)
Dept: BARIATRICS/WEIGHT MGMT | Age: 65
End: 2025-07-14

## 2025-07-14 NOTE — TELEPHONE ENCOUNTER
Submitted PA for Zepbound 10MG/0.5ML pen-injectors  Via [facility] [id number] STATUS: PENDING.    Follow up done daily; if no decision with in three days we will refax.  If another three days goes by with no decision will call the insurance for status.

## 2025-07-14 NOTE — PROGRESS NOTES
Amina ROBLES Fab stable. Self reported wt: 175 lbs    Treatment plan details: 1200 kcal LCMP  Is patient adhering to treatment plan: yes Feels that she following but in wt stall     Is pt eating 4-5 times each day? Pt describes change in taste, reduced food noise. Limits carbs, but does include small portions. Reports she may be low intake protein some days.   Sleeps ~7 hours, wakes up throughout night.     B: low sugar cereal + sf almond milk OR 1 egg + 1 toast OR premier/kodiak pancakes  S: none  L: 1/2 turkey sandwich LC bread + cucumbers/fruit OR Flavcity protein shake + fruit + almond milk OR chobani yogurt OR CC + fruit   D: chx (sausage/breast)/ steak/lean beef  + veggies + pasta (limits 1/2 cup-1 cup)    Is pt including lean protein with all meals and snacks? yes      Is pt avoiding added sugars and starchy foods? limits    Consuming at least 64oz of calorie free fluids? States low fluids, ~30 oz    Participating in intentional exercise? Private pilates - 1X week; walking 3X/week 1-2 miles. Weights at home - PT with knee.       Plan/Goals:   - Aim for 48-64oz fluid per day, set timers  - Add in additional pilates/strength training (2X/week consistently)   - continue to focus on protein + produce     Handouts: cooking resources, protein content of vegetarian foods    VIANEY JENNINGS, RD

## 2025-07-15 DIAGNOSIS — E66.811 CLASS 1 OBESITY: Primary | ICD-10-CM

## 2025-07-15 NOTE — TELEPHONE ENCOUNTER
The medication was DENIED; DENIAL letter is uploaded to [employer].    Generic Denial:  Other; please see Denial Letter.       Note :  The request for coverage for ZEPBOUND INJ 10/0.5ML, use as directed (2 per 28 days), is denied. This decision is based on health plan criteria for ZEPBOUND INJ 10/0.5ML. This medicine is covered only if: Submission of medical records documenting all the following: (A) You have at least one previous unsuccessful dietary effort to lose weight.(B) One of the following: (I) You have continued symptoms of obstructive sleep apnea despite adherence to positive airway pressure therapy. Adherence is defined as greater than or equal to 4 hours of use per night for greater than or equal to 70 percent of nights. (II) You are not a candidate for positive airway pressure therapy (for example: upper airway anatomic abnormalities). The information provided does not show that you meet the criteria listed above. Reviewed by: Pamela      If you want an APPEAL; please note in this encounter what new information you would like to APPEAL with.  Once complete route back to PA POOL.    If this requires a response please respond to the pool ( P [facility] MEDICATION PRE-AUTH).      Thank you please advise patient.

## 2025-07-28 ENCOUNTER — TELEPHONE (OUTPATIENT)
Dept: BARIATRICS/WEIGHT MGMT | Age: 65
End: 2025-07-28

## 2025-07-29 NOTE — TELEPHONE ENCOUNTER
Patient leaving for cruise Friday 8/1 (will inject Zepbound this day) Returning Saturday 8/9 (will inject nxt dose this day) She would like to go back to Friday injections after 8/9. Advised patient ok to take following injection Friday 8/15. Follow-up appointment moved up to 8/11 with Dr Sinclair due to mail order.

## 2025-08-11 ENCOUNTER — TELEMEDICINE (OUTPATIENT)
Dept: BARIATRICS/WEIGHT MGMT | Age: 65
End: 2025-08-11

## 2025-08-11 DIAGNOSIS — Z71.3 DIETARY COUNSELING AND SURVEILLANCE: ICD-10-CM

## 2025-08-11 DIAGNOSIS — E66.811 CLASS 1 OBESITY: Primary | ICD-10-CM

## 2025-08-11 PROCEDURE — 99214 OFFICE O/P EST MOD 30 MIN: CPT | Performed by: FAMILY MEDICINE

## 2025-09-02 RX ORDER — METOPROLOL TARTRATE 25 MG/1
25 TABLET, FILM COATED ORAL 2 TIMES DAILY
Qty: 180 TABLET | Refills: 3 | Status: SHIPPED | OUTPATIENT
Start: 2025-09-02